# Patient Record
Sex: FEMALE | Race: WHITE | Employment: PART TIME | URBAN - METROPOLITAN AREA
[De-identification: names, ages, dates, MRNs, and addresses within clinical notes are randomized per-mention and may not be internally consistent; named-entity substitution may affect disease eponyms.]

---

## 2017-01-04 ENCOUNTER — OFFICE VISIT (OUTPATIENT)
Dept: ORTHOPEDIC SURGERY | Age: 52
End: 2017-01-04

## 2017-01-04 VITALS
BODY MASS INDEX: 33.46 KG/M2 | WEIGHT: 196 LBS | DIASTOLIC BLOOD PRESSURE: 88 MMHG | SYSTOLIC BLOOD PRESSURE: 140 MMHG | HEART RATE: 72 BPM | HEIGHT: 64 IN

## 2017-01-04 DIAGNOSIS — M25.572 LEFT LATERAL ANKLE PAIN: Primary | ICD-10-CM

## 2017-01-04 PROCEDURE — 99212 OFFICE O/P EST SF 10 MIN: CPT | Performed by: ORTHOPAEDIC SURGERY

## 2017-01-04 RX ORDER — GABAPENTIN 100 MG/1
CAPSULE ORAL
Qty: 120 CAPSULE | Refills: 3 | Status: SHIPPED | OUTPATIENT
Start: 2017-01-04 | End: 2017-05-02 | Stop reason: SDUPTHER

## 2017-04-05 ENCOUNTER — OFFICE VISIT (OUTPATIENT)
Dept: ORTHOPEDIC SURGERY | Age: 52
End: 2017-04-05

## 2017-04-05 VITALS
WEIGHT: 195.99 LBS | HEART RATE: 73 BPM | SYSTOLIC BLOOD PRESSURE: 136 MMHG | BODY MASS INDEX: 33.46 KG/M2 | HEIGHT: 64 IN | DIASTOLIC BLOOD PRESSURE: 87 MMHG

## 2017-04-05 DIAGNOSIS — M25.372 ANKLE JOINT INSTABILITY, LEFT: Primary | ICD-10-CM

## 2017-04-05 PROCEDURE — 99212 OFFICE O/P EST SF 10 MIN: CPT | Performed by: ORTHOPAEDIC SURGERY

## 2017-04-05 RX ORDER — MELOXICAM 15 MG/1
15 TABLET ORAL DAILY
Qty: 30 TABLET | Refills: 2 | Status: SHIPPED | OUTPATIENT
Start: 2017-04-05 | End: 2018-02-14

## 2017-05-02 DIAGNOSIS — M25.572 LEFT LATERAL ANKLE PAIN: ICD-10-CM

## 2017-05-02 RX ORDER — GABAPENTIN 100 MG/1
CAPSULE ORAL
Qty: 120 CAPSULE | Refills: 3 | Status: SHIPPED | OUTPATIENT
Start: 2017-05-02 | End: 2017-08-31 | Stop reason: SDUPTHER

## 2017-06-15 ENCOUNTER — HOSPITAL ENCOUNTER (OUTPATIENT)
Dept: SURGERY | Age: 52
Discharge: HOME | End: 2017-06-15
Attending: INTERNAL MEDICINE | Admitting: INTERNAL MEDICINE

## 2017-06-15 VITALS
RESPIRATION RATE: 14 BRPM | OXYGEN SATURATION: 97 % | HEIGHT: 64 IN | WEIGHT: 215 LBS | SYSTOLIC BLOOD PRESSURE: 102 MMHG | HEART RATE: 91 BPM | TEMPERATURE: 98.1 F | BODY MASS INDEX: 36.7 KG/M2 | DIASTOLIC BLOOD PRESSURE: 69 MMHG

## 2017-06-15 DIAGNOSIS — K52.9 NONINFECTIVE GASTROENTERITIS AND COLITIS: ICD-10-CM

## 2017-06-15 RX ORDER — DIPHENHYDRAMINE HYDROCHLORIDE 50 MG/ML
12.5 INJECTION INTRAMUSCULAR; INTRAVENOUS
Status: ACTIVE | OUTPATIENT
Start: 2017-06-15 | End: 2017-06-15

## 2017-06-15 RX ORDER — OXYCODONE HYDROCHLORIDE AND ACETAMINOPHEN 5; 325 MG/1; MG/1
1 TABLET ORAL PRN
Status: ACTIVE | OUTPATIENT
Start: 2017-06-15 | End: 2017-06-15

## 2017-06-15 RX ORDER — PROMETHAZINE HYDROCHLORIDE 25 MG/ML
6.25 INJECTION, SOLUTION INTRAMUSCULAR; INTRAVENOUS
Status: ACTIVE | OUTPATIENT
Start: 2017-06-15 | End: 2017-06-15

## 2017-06-15 RX ORDER — HYDRALAZINE HYDROCHLORIDE 20 MG/ML
5 INJECTION INTRAMUSCULAR; INTRAVENOUS EVERY 10 MIN PRN
Status: DISCONTINUED | OUTPATIENT
Start: 2017-06-15 | End: 2017-06-16 | Stop reason: HOSPADM

## 2017-06-15 RX ORDER — OXYCODONE HYDROCHLORIDE AND ACETAMINOPHEN 5; 325 MG/1; MG/1
2 TABLET ORAL PRN
Status: ACTIVE | OUTPATIENT
Start: 2017-06-15 | End: 2017-06-15

## 2017-06-15 RX ORDER — ONDANSETRON 2 MG/ML
4 INJECTION INTRAMUSCULAR; INTRAVENOUS
Status: ACTIVE | OUTPATIENT
Start: 2017-06-15 | End: 2017-06-15

## 2017-06-15 RX ORDER — MORPHINE SULFATE 2 MG/ML
1 INJECTION, SOLUTION INTRAMUSCULAR; INTRAVENOUS EVERY 5 MIN PRN
Status: DISCONTINUED | OUTPATIENT
Start: 2017-06-15 | End: 2017-06-16 | Stop reason: HOSPADM

## 2017-06-15 RX ORDER — SODIUM CHLORIDE, SODIUM LACTATE, POTASSIUM CHLORIDE, CALCIUM CHLORIDE 600; 310; 30; 20 MG/100ML; MG/100ML; MG/100ML; MG/100ML
INJECTION, SOLUTION INTRAVENOUS CONTINUOUS
Status: DISCONTINUED | OUTPATIENT
Start: 2017-06-15 | End: 2017-06-16 | Stop reason: HOSPADM

## 2017-06-15 RX ORDER — LIDOCAINE HYDROCHLORIDE 10 MG/ML
0.1 INJECTION, SOLUTION EPIDURAL; INFILTRATION; INTRACAUDAL; PERINEURAL ONCE
Status: DISCONTINUED | OUTPATIENT
Start: 2017-06-15 | End: 2017-06-16 | Stop reason: HOSPADM

## 2017-06-15 RX ORDER — MEPERIDINE HYDROCHLORIDE 50 MG/ML
12.5 INJECTION INTRAMUSCULAR; INTRAVENOUS; SUBCUTANEOUS EVERY 5 MIN PRN
Status: DISCONTINUED | OUTPATIENT
Start: 2017-06-15 | End: 2017-06-16 | Stop reason: HOSPADM

## 2017-06-15 RX ORDER — LABETALOL HYDROCHLORIDE 5 MG/ML
5 INJECTION, SOLUTION INTRAVENOUS EVERY 10 MIN PRN
Status: DISCONTINUED | OUTPATIENT
Start: 2017-06-15 | End: 2017-06-16 | Stop reason: HOSPADM

## 2017-06-15 RX ORDER — MORPHINE SULFATE 2 MG/ML
2 INJECTION, SOLUTION INTRAMUSCULAR; INTRAVENOUS EVERY 5 MIN PRN
Status: DISCONTINUED | OUTPATIENT
Start: 2017-06-15 | End: 2017-06-16 | Stop reason: HOSPADM

## 2017-06-15 RX ADMIN — SODIUM CHLORIDE, SODIUM LACTATE, POTASSIUM CHLORIDE, CALCIUM CHLORIDE: 600; 310; 30; 20 INJECTION, SOLUTION INTRAVENOUS at 09:41

## 2017-06-15 ASSESSMENT — ENCOUNTER SYMPTOMS: DIARRHEA: 1

## 2017-06-15 ASSESSMENT — PAIN - FUNCTIONAL ASSESSMENT: PAIN_FUNCTIONAL_ASSESSMENT: 0-10

## 2017-06-15 ASSESSMENT — PAIN SCALES - GENERAL: PAINLEVEL_OUTOF10: 0

## 2017-07-18 ENCOUNTER — OFFICE VISIT (OUTPATIENT)
Dept: ORTHOPEDIC SURGERY | Age: 52
End: 2017-07-18

## 2017-07-18 VITALS
BODY MASS INDEX: 36.7 KG/M2 | HEIGHT: 64 IN | WEIGHT: 214.95 LBS | DIASTOLIC BLOOD PRESSURE: 92 MMHG | HEART RATE: 67 BPM | SYSTOLIC BLOOD PRESSURE: 147 MMHG

## 2017-07-18 DIAGNOSIS — M25.372 ANKLE JOINT INSTABILITY, LEFT: Primary | ICD-10-CM

## 2017-07-18 PROCEDURE — 99212 OFFICE O/P EST SF 10 MIN: CPT | Performed by: ORTHOPAEDIC SURGERY

## 2017-07-18 RX ORDER — LIDOCAINE 50 MG/G
1 PATCH TOPICAL EVERY 12 HOURS
Qty: 30 PATCH | Refills: 0 | Status: SHIPPED | OUTPATIENT
Start: 2017-07-18 | End: 2018-02-14

## 2017-08-31 DIAGNOSIS — M25.572 LEFT LATERAL ANKLE PAIN: ICD-10-CM

## 2017-09-01 RX ORDER — GABAPENTIN 100 MG/1
CAPSULE ORAL
Qty: 120 CAPSULE | Refills: 3 | Status: SHIPPED | OUTPATIENT
Start: 2017-09-01 | End: 2017-12-11 | Stop reason: SDUPTHER

## 2017-09-15 DIAGNOSIS — M25.572 LEFT LATERAL ANKLE PAIN: ICD-10-CM

## 2017-09-15 RX ORDER — GABAPENTIN 100 MG/1
CAPSULE ORAL
Qty: 120 CAPSULE | Refills: 2 | Status: SHIPPED | OUTPATIENT
Start: 2017-09-15 | End: 2018-04-11 | Stop reason: SDUPTHER

## 2017-10-19 ENCOUNTER — HOSPITAL ENCOUNTER (OUTPATIENT)
Dept: ULTRASOUND IMAGING | Age: 52
Discharge: HOME | End: 2017-10-19
Attending: INTERNAL MEDICINE | Admitting: INTERNAL MEDICINE

## 2017-10-19 DIAGNOSIS — K22.70 BARRETT'S ESOPHAGUS WITHOUT DYSPLASIA: ICD-10-CM

## 2017-12-06 ENCOUNTER — HOSPITAL ENCOUNTER (OUTPATIENT)
Dept: OTHER | Age: 52
Discharge: HOME | End: 2017-12-06
Attending: FAMILY MEDICINE | Admitting: FAMILY MEDICINE

## 2017-12-06 DIAGNOSIS — R05.9 COUGH: ICD-10-CM

## 2017-12-11 DIAGNOSIS — M25.572 LEFT LATERAL ANKLE PAIN: ICD-10-CM

## 2017-12-12 RX ORDER — GABAPENTIN 100 MG/1
CAPSULE ORAL
Qty: 120 CAPSULE | Refills: 3 | Status: SHIPPED | OUTPATIENT
Start: 2017-12-12 | End: 2018-02-14

## 2018-02-13 ENCOUNTER — OFFICE VISIT (OUTPATIENT)
Dept: ORTHOPEDIC SURGERY | Age: 53
End: 2018-02-13

## 2018-02-13 VITALS
BODY MASS INDEX: 36.7 KG/M2 | HEART RATE: 74 BPM | HEIGHT: 64 IN | DIASTOLIC BLOOD PRESSURE: 83 MMHG | WEIGHT: 214.95 LBS | SYSTOLIC BLOOD PRESSURE: 146 MMHG

## 2018-02-13 DIAGNOSIS — M25.571 RIGHT ANKLE PAIN, UNSPECIFIED CHRONICITY: Primary | ICD-10-CM

## 2018-02-13 PROCEDURE — G8417 CALC BMI ABV UP PARAM F/U: HCPCS | Performed by: ORTHOPAEDIC SURGERY

## 2018-02-13 PROCEDURE — 3014F SCREEN MAMMO DOC REV: CPT | Performed by: ORTHOPAEDIC SURGERY

## 2018-02-13 PROCEDURE — G8484 FLU IMMUNIZE NO ADMIN: HCPCS | Performed by: ORTHOPAEDIC SURGERY

## 2018-02-13 PROCEDURE — 99212 OFFICE O/P EST SF 10 MIN: CPT | Performed by: ORTHOPAEDIC SURGERY

## 2018-02-13 PROCEDURE — 3017F COLORECTAL CA SCREEN DOC REV: CPT | Performed by: ORTHOPAEDIC SURGERY

## 2018-02-13 PROCEDURE — 1036F TOBACCO NON-USER: CPT | Performed by: ORTHOPAEDIC SURGERY

## 2018-02-13 PROCEDURE — G8427 DOCREV CUR MEDS BY ELIG CLIN: HCPCS | Performed by: ORTHOPAEDIC SURGERY

## 2018-02-13 RX ORDER — ASPIRIN 81 MG/1
TABLET, CHEWABLE ORAL
COMMUNITY
Start: 2018-02-07

## 2018-02-13 RX ORDER — DICYCLOMINE HYDROCHLORIDE 10 MG/1
10 CAPSULE ORAL 2 TIMES DAILY PRN
COMMUNITY
Start: 2018-01-25 | End: 2019-06-04

## 2018-02-13 RX ORDER — BUTALBITAL, ACETAMINOPHEN AND CAFFEINE 50; 325; 40 MG/1; MG/1; MG/1
1 TABLET ORAL
COMMUNITY
Start: 2018-01-03

## 2018-02-13 RX ORDER — BUDESONIDE AND FORMOTEROL FUMARATE DIHYDRATE 160; 4.5 UG/1; UG/1
2 AEROSOL RESPIRATORY (INHALATION)
Status: ON HOLD | COMMUNITY
Start: 2018-02-09 | End: 2019-06-10

## 2018-02-13 RX ORDER — DICLOFENAC SODIUM 75 MG/1
75 TABLET, DELAYED RELEASE ORAL
COMMUNITY

## 2018-02-13 RX ORDER — METHYLPREDNISOLONE 4 MG/1
TABLET ORAL
COMMUNITY
Start: 2018-01-03 | End: 2018-02-14

## 2018-02-13 RX ORDER — CEFUROXIME AXETIL 500 MG/1
TABLET ORAL
COMMUNITY
Start: 2017-11-29 | End: 2018-02-14

## 2018-02-13 RX ORDER — ASPIRIN 81 MG
TABLET,CHEWABLE ORAL
COMMUNITY
Start: 2018-01-22 | End: 2018-02-14

## 2018-02-13 RX ORDER — BENZONATATE 200 MG/1
200 CAPSULE ORAL PRN
COMMUNITY
Start: 2018-01-22

## 2018-02-13 RX ORDER — CHOLESTYRAMINE 4 G/9G
1 POWDER, FOR SUSPENSION ORAL DAILY
COMMUNITY
Start: 2018-02-01 | End: 2019-06-04

## 2018-02-13 RX ORDER — ONDANSETRON 8 MG/1
TABLET, ORALLY DISINTEGRATING ORAL
COMMUNITY
Start: 2018-02-09 | End: 2018-02-14

## 2018-02-13 RX ORDER — PREDNISONE 10 MG/1
TABLET ORAL
COMMUNITY
Start: 2018-01-22 | End: 2018-02-14

## 2018-02-13 RX ORDER — CEFDINIR 300 MG/1
CAPSULE ORAL
COMMUNITY
Start: 2018-01-26 | End: 2018-02-14

## 2018-02-13 RX ORDER — LEVOFLOXACIN 750 MG/1
750 TABLET ORAL
COMMUNITY
Start: 2018-01-16 | End: 2018-02-14

## 2018-02-13 RX ORDER — OMEPRAZOLE 40 MG/1
CAPSULE, DELAYED RELEASE ORAL
COMMUNITY
Start: 2018-01-22 | End: 2018-02-14

## 2018-02-13 RX ORDER — SUCRALFATE 1 G/1
1 TABLET ORAL 4 TIMES DAILY
COMMUNITY
Start: 2018-01-22 | End: 2019-06-04

## 2018-02-13 NOTE — PROGRESS NOTES
Subjective: Patient states that she is here for follow-up of her right ankle arthroscopy lateral ligament and peroneal tendon repair. She had postoperative neuralgia states that she has a new problem which is issues with her long and chest.  She states her immune system is down. Without history of trauma she's developed some instability of the right ankle. She denies much in the way of pain but doesn't feel stable at all. Wears a brace on a regular basis. The bladder issues have resolved. She feels she is not getting answers as far as her chest and lungs concerned  Objective: Physical exam shows no significant swelling erythema or ecchymosis and the right foot and ankle. Her strength is 3+ to 4 minus over 5 in a dorsiflexion eversion plantarflexion of the 1st ray anterior drawer and talar tilt show mild instability but good [place]. No midfoot instability. Range of motion is within normal limits no neuralgia   Imaging:3 views of the right ankle show no evidence of osseous lesion   Assessment and plan I recommended that she consider seeing a pulmonologist.  I talked her about the [place] [facility]. She is still working at [employer] sitting down through the day and likes this job.   She'll follow up with me as needed:

## 2018-04-11 DIAGNOSIS — M25.572 LEFT LATERAL ANKLE PAIN: ICD-10-CM

## 2018-04-11 RX ORDER — GABAPENTIN 100 MG/1
CAPSULE ORAL
Qty: 120 CAPSULE | Refills: 11 | Status: SHIPPED | OUTPATIENT
Start: 2018-04-11 | End: 2019-04-19 | Stop reason: SDUPTHER

## 2018-06-11 ENCOUNTER — TELEPHONE (OUTPATIENT)
Dept: ORTHOPEDIC SURGERY | Age: 53
End: 2018-06-11

## 2018-09-05 ENCOUNTER — HOSPITAL ENCOUNTER (OUTPATIENT)
Age: 53
Discharge: HOME | End: 2018-09-05
Payer: COMMERCIAL

## 2018-09-05 ENCOUNTER — HOSPITAL ENCOUNTER (OUTPATIENT)
Dept: GENERAL RADIOLOGY | Age: 53
Discharge: HOME | End: 2018-09-05
Payer: COMMERCIAL

## 2018-09-05 PROCEDURE — 71046 X-RAY EXAM CHEST 2 VIEWS: CPT

## 2019-04-19 DIAGNOSIS — M25.572 LEFT LATERAL ANKLE PAIN: ICD-10-CM

## 2019-04-19 RX ORDER — GABAPENTIN 100 MG/1
CAPSULE ORAL
Qty: 120 CAPSULE | Refills: 11 | Status: SHIPPED | OUTPATIENT
Start: 2019-04-19 | End: 2019-07-18

## 2019-05-24 ENCOUNTER — OFFICE VISIT (OUTPATIENT)
Dept: ORTHOPEDIC SURGERY | Age: 54
End: 2019-05-24
Payer: COMMERCIAL

## 2019-05-24 VITALS
HEART RATE: 72 BPM | SYSTOLIC BLOOD PRESSURE: 128 MMHG | DIASTOLIC BLOOD PRESSURE: 75 MMHG | BODY MASS INDEX: 36.7 KG/M2 | HEIGHT: 64 IN | WEIGHT: 214.95 LBS

## 2019-05-24 DIAGNOSIS — M25.372 INSTABILITY OF LEFT ANKLE JOINT: Primary | ICD-10-CM

## 2019-05-24 DIAGNOSIS — M70.50 PES ANSERINE BURSITIS: ICD-10-CM

## 2019-05-24 PROCEDURE — 3017F COLORECTAL CA SCREEN DOC REV: CPT | Performed by: ORTHOPAEDIC SURGERY

## 2019-05-24 PROCEDURE — 99213 OFFICE O/P EST LOW 20 MIN: CPT | Performed by: ORTHOPAEDIC SURGERY

## 2019-05-24 PROCEDURE — G8427 DOCREV CUR MEDS BY ELIG CLIN: HCPCS | Performed by: ORTHOPAEDIC SURGERY

## 2019-05-24 PROCEDURE — G8417 CALC BMI ABV UP PARAM F/U: HCPCS | Performed by: ORTHOPAEDIC SURGERY

## 2019-05-24 PROCEDURE — 1036F TOBACCO NON-USER: CPT | Performed by: ORTHOPAEDIC SURGERY

## 2019-05-24 PROCEDURE — 20500 NJX SINUS TRACT THERAPEUTIC: CPT | Performed by: ORTHOPAEDIC SURGERY

## 2019-05-25 NOTE — PROGRESS NOTES
Subjective: Patient is here for follow-up of bilateral ankle problems. I previously did a left ankle arthroscopy lateral ligament repair and peroneal tendon repair which she has recovered from completely. She had some postoperative neuralgia which resolved. She states her left ankle is doing well and she's happy. She has had some recent problems with pneumonia and had what was felt to be aspiration pneumonia. She underwent a fundoplication and this has resolved her reflux but she continues to have some breathing issues. She currently complains of pain over the medial aspect of her left knee just below the joint line and on the lateral aspect of her right ankle similar to the left side. Objective: Physical exam shows left knee shows mild swelling over the Pes anserine bursa she has tenderness with hip external rotation knee flexion. No varus valgus instability and posterior drawer negative. In the right ankle she has gross instability to anterior drawer and talar tilt. Strength is 4/5 in a dorsiflexion eversion with increased pain posterior laterally. No midfoot instability. Sensations intact antalgic gait  Imaging: 3 views of the right ankle show no obvious osseous lesion  Assessment and plan: She has Pes anserine bursitis and I went ahead and injected her with Marcaine and lidocaine and Kenalog 2/2/1 mL in the region of the Pes anserine bursa. She would like to proceed with surgery on the right ankle and I recommended she follow up with her primary care and pulmonologist to make sure this was okay. She would need a right ankle arthroscopy lateral ligament peroneal tendon repair. Questions were answered no guarantees were given or implied.   Review of systems was performed based on the intake sheet dated 5/24/19

## 2019-06-03 NOTE — PROGRESS NOTES
Jearld Comes    Age 49 y.o.    female    1965    MRN [id number]    Date___________   Arrival Time_____________  OR Time____________Duration____     Procedure(s):  VIDEO ARTHROSCOPY RIGHT ANKLE  LATERAL LIGAMENT REPAIR AND PERONEAL TENDON REPAIR    Surgeon  ________________________________  [facility]   General   [place]       Phone [phone] (home)       [address]  Cell Work  ______________________________________________________________________________________________________________________________________________________________________________________________________________________________________________________________________________________________________________________________________________________________    PCP__________________________Phone__________________________________      H&P__________________Bringing      Chart              Epic    DOS           Called_______  EKG__________________Bringing      Chart              Epic    DOS           Called_______  LAB__________________ Bringing      Chart              Epic    DOS           Called_______  CardiacClearance _______Bringing      Chart              Epic    DOS           Called_______      Cardiologist________________________ Phone___________________________      ? Judaism concerns / Waiver on Chart            PAT Communications________________  ? Pre-op Instructions Given [place]          _________________________________  ? Directions to Surgery Center                          _________________________________  ? Transportation Home_______________      _________________________________  ?  Crutches/Walker__________________        _________________________________      ________Pre-op Orders   _______Transcribed    _______Wt.  ________Pharmacy          _______SCD  ______VTE     ______[employer]ocker  ________Consent

## 2019-06-04 ENCOUNTER — TELEPHONE (OUTPATIENT)
Dept: ORTHOPEDIC SURGERY | Age: 54
End: 2019-06-04

## 2019-06-04 RX ORDER — PANTOPRAZOLE SODIUM 40 MG/1
40 TABLET, DELAYED RELEASE ORAL 2 TIMES DAILY
COMMUNITY

## 2019-06-04 RX ORDER — MONTELUKAST SODIUM 10 MG/1
10 TABLET ORAL NIGHTLY
COMMUNITY

## 2019-06-04 RX ORDER — ALBUTEROL SULFATE 0.63 MG/3ML
1 SOLUTION RESPIRATORY (INHALATION) PRN
COMMUNITY

## 2019-06-04 RX ORDER — DESVENLAFAXINE 50 MG/1
50 TABLET, EXTENDED RELEASE ORAL DAILY
COMMUNITY

## 2019-06-04 NOTE — TELEPHONE ENCOUNTER
Auth: NPR  Date: 6/10/19  Reference # None  Spoke with: Online  Type of SX: Outpatient  Location: [facility]  CPT [id number], [id number], [id number]   SX area: Rt ankle

## 2019-06-09 ENCOUNTER — ANESTHESIA EVENT (OUTPATIENT)
Dept: OPERATING ROOM | Age: 54
End: 2019-06-09
Payer: COMMERCIAL

## 2019-06-10 ENCOUNTER — ANESTHESIA (OUTPATIENT)
Dept: OPERATING ROOM | Age: 54
End: 2019-06-10
Payer: COMMERCIAL

## 2019-06-10 ENCOUNTER — HOSPITAL ENCOUNTER (OUTPATIENT)
Age: 54
Setting detail: OUTPATIENT SURGERY
Discharge: HOME | End: 2019-06-10
Attending: ORTHOPAEDIC SURGERY | Admitting: ORTHOPAEDIC SURGERY
Payer: COMMERCIAL

## 2019-06-10 VITALS
WEIGHT: 226 LBS | TEMPERATURE: 98.3 F | HEIGHT: 64 IN | DIASTOLIC BLOOD PRESSURE: 81 MMHG | RESPIRATION RATE: 13 BRPM | BODY MASS INDEX: 38.58 KG/M2 | HEART RATE: 76 BPM | OXYGEN SATURATION: 97 % | SYSTOLIC BLOOD PRESSURE: 115 MMHG

## 2019-06-10 VITALS
RESPIRATION RATE: 6 BRPM | SYSTOLIC BLOOD PRESSURE: 128 MMHG | OXYGEN SATURATION: 99 % | DIASTOLIC BLOOD PRESSURE: 79 MMHG

## 2019-06-10 DIAGNOSIS — M25.372 INSTABILITY OF LEFT ANKLE JOINT: Primary | ICD-10-CM

## 2019-06-10 PROCEDURE — 2580000003 HC RX 258: Performed by: ANESTHESIOLOGY

## 2019-06-10 PROCEDURE — 2580000003 HC RX 258: Performed by: ORTHOPAEDIC SURGERY

## 2019-06-10 PROCEDURE — C1713 ANCHOR/SCREW BN/BN,TIS/BN: HCPCS | Performed by: ORTHOPAEDIC SURGERY

## 2019-06-10 PROCEDURE — 6360000002 HC RX W HCPCS: Performed by: NURSE ANESTHETIST, CERTIFIED REGISTERED

## 2019-06-10 PROCEDURE — 7100000000 HC PACU RECOVERY - FIRST 15 MIN: Performed by: ORTHOPAEDIC SURGERY

## 2019-06-10 PROCEDURE — 7100000010 HC PHASE II RECOVERY - FIRST 15 MIN: Performed by: ORTHOPAEDIC SURGERY

## 2019-06-10 PROCEDURE — 64447 NJX AA&/STRD FEMORAL NRV IMG: CPT | Performed by: ANESTHESIOLOGY

## 2019-06-10 PROCEDURE — 2500000003 HC RX 250 WO HCPCS: Performed by: NURSE ANESTHETIST, CERTIFIED REGISTERED

## 2019-06-10 PROCEDURE — 2709999900 HC NON-CHARGEABLE SUPPLY: Performed by: ORTHOPAEDIC SURGERY

## 2019-06-10 PROCEDURE — 7100000011 HC PHASE II RECOVERY - ADDTL 15 MIN: Performed by: ORTHOPAEDIC SURGERY

## 2019-06-10 PROCEDURE — 6360000002 HC RX W HCPCS: Performed by: ANESTHESIOLOGY

## 2019-06-10 PROCEDURE — 3600000004 HC SURGERY LEVEL 4 BASE: Performed by: ORTHOPAEDIC SURGERY

## 2019-06-10 PROCEDURE — 6360000002 HC RX W HCPCS: Performed by: ORTHOPAEDIC SURGERY

## 2019-06-10 PROCEDURE — 3700000001 HC ADD 15 MINUTES (ANESTHESIA): Performed by: ORTHOPAEDIC SURGERY

## 2019-06-10 PROCEDURE — 7100000001 HC PACU RECOVERY - ADDTL 15 MIN: Performed by: ORTHOPAEDIC SURGERY

## 2019-06-10 PROCEDURE — 3600000014 HC SURGERY LEVEL 4 ADDTL 15MIN: Performed by: ORTHOPAEDIC SURGERY

## 2019-06-10 PROCEDURE — 3700000000 HC ANESTHESIA ATTENDED CARE: Performed by: ORTHOPAEDIC SURGERY

## 2019-06-10 PROCEDURE — 2500000003 HC RX 250 WO HCPCS: Performed by: ANESTHESIOLOGY

## 2019-06-10 PROCEDURE — 64445 NJX AA&/STRD SCIATIC NRV IMG: CPT | Performed by: ANESTHESIOLOGY

## 2019-06-10 DEVICE — KIT INSTR INTERNALBRACE LIG AUG REP CLLGN COAT FBR TAPE W/: Type: IMPLANTABLE DEVICE | Site: ANKLE | Status: FUNCTIONAL

## 2019-06-10 RX ORDER — MORPHINE SULFATE 2 MG/ML
1 INJECTION, SOLUTION INTRAMUSCULAR; INTRAVENOUS EVERY 5 MIN PRN
Status: DISCONTINUED | OUTPATIENT
Start: 2019-06-10 | End: 2019-06-10 | Stop reason: HOSPADM

## 2019-06-10 RX ORDER — HYDRALAZINE HYDROCHLORIDE 20 MG/ML
5 INJECTION INTRAMUSCULAR; INTRAVENOUS EVERY 10 MIN PRN
Status: DISCONTINUED | OUTPATIENT
Start: 2019-06-10 | End: 2019-06-10 | Stop reason: HOSPADM

## 2019-06-10 RX ORDER — LABETALOL HYDROCHLORIDE 5 MG/ML
5 INJECTION, SOLUTION INTRAVENOUS EVERY 10 MIN PRN
Status: DISCONTINUED | OUTPATIENT
Start: 2019-06-10 | End: 2019-06-10 | Stop reason: HOSPADM

## 2019-06-10 RX ORDER — LIDOCAINE HYDROCHLORIDE 20 MG/ML
INJECTION, SOLUTION INFILTRATION; PERINEURAL PRN
Status: DISCONTINUED | OUTPATIENT
Start: 2019-06-10 | End: 2019-06-10 | Stop reason: SDUPTHER

## 2019-06-10 RX ORDER — OXYCODONE HYDROCHLORIDE 5 MG/1
10 TABLET ORAL PRN
Status: DISCONTINUED | OUTPATIENT
Start: 2019-06-10 | End: 2019-06-10 | Stop reason: HOSPADM

## 2019-06-10 RX ORDER — PROMETHAZINE HYDROCHLORIDE 25 MG/ML
6.25 INJECTION, SOLUTION INTRAMUSCULAR; INTRAVENOUS
Status: DISCONTINUED | OUTPATIENT
Start: 2019-06-10 | End: 2019-06-10 | Stop reason: HOSPADM

## 2019-06-10 RX ORDER — DIPHENHYDRAMINE HYDROCHLORIDE 50 MG/ML
12.5 INJECTION INTRAMUSCULAR; INTRAVENOUS
Status: DISCONTINUED | OUTPATIENT
Start: 2019-06-10 | End: 2019-06-10 | Stop reason: HOSPADM

## 2019-06-10 RX ORDER — ONDANSETRON 2 MG/ML
INJECTION INTRAMUSCULAR; INTRAVENOUS PRN
Status: DISCONTINUED | OUTPATIENT
Start: 2019-06-10 | End: 2019-06-10 | Stop reason: SDUPTHER

## 2019-06-10 RX ORDER — PROPOFOL 10 MG/ML
INJECTION, EMULSION INTRAVENOUS PRN
Status: DISCONTINUED | OUTPATIENT
Start: 2019-06-10 | End: 2019-06-10 | Stop reason: SDUPTHER

## 2019-06-10 RX ORDER — SODIUM CHLORIDE, SODIUM LACTATE, POTASSIUM CHLORIDE, CALCIUM CHLORIDE 600; 310; 30; 20 MG/100ML; MG/100ML; MG/100ML; MG/100ML
INJECTION, SOLUTION INTRAVENOUS CONTINUOUS
Status: DISCONTINUED | OUTPATIENT
Start: 2019-06-10 | End: 2019-06-10 | Stop reason: HOSPADM

## 2019-06-10 RX ORDER — MIDAZOLAM HYDROCHLORIDE 1 MG/ML
INJECTION INTRAMUSCULAR; INTRAVENOUS
Status: COMPLETED
Start: 2019-06-10 | End: 2019-06-10

## 2019-06-10 RX ORDER — METOCLOPRAMIDE HYDROCHLORIDE 5 MG/ML
10 INJECTION INTRAMUSCULAR; INTRAVENOUS
Status: DISCONTINUED | OUTPATIENT
Start: 2019-06-10 | End: 2019-06-10 | Stop reason: HOSPADM

## 2019-06-10 RX ORDER — MIDAZOLAM HYDROCHLORIDE 1 MG/ML
INJECTION INTRAMUSCULAR; INTRAVENOUS PRN
Status: DISCONTINUED | OUTPATIENT
Start: 2019-06-10 | End: 2019-06-10 | Stop reason: SDUPTHER

## 2019-06-10 RX ORDER — FENTANYL CITRATE 50 UG/ML
INJECTION, SOLUTION INTRAMUSCULAR; INTRAVENOUS
Status: COMPLETED
Start: 2019-06-10 | End: 2019-06-10

## 2019-06-10 RX ORDER — LIDOCAINE HYDROCHLORIDE 10 MG/ML
2 INJECTION, SOLUTION INFILTRATION; PERINEURAL
Status: DISCONTINUED | OUTPATIENT
Start: 2019-06-10 | End: 2019-06-10 | Stop reason: HOSPADM

## 2019-06-10 RX ORDER — FENTANYL CITRATE 50 UG/ML
INJECTION, SOLUTION INTRAMUSCULAR; INTRAVENOUS PRN
Status: DISCONTINUED | OUTPATIENT
Start: 2019-06-10 | End: 2019-06-10 | Stop reason: SDUPTHER

## 2019-06-10 RX ORDER — OXYCODONE HYDROCHLORIDE AND ACETAMINOPHEN 5; 325 MG/1; MG/1
1 TABLET ORAL EVERY 6 HOURS PRN
Qty: 28 TABLET | Refills: 0 | Status: SHIPPED | OUTPATIENT
Start: 2019-06-10 | End: 2019-06-17

## 2019-06-10 RX ORDER — SODIUM CHLORIDE, SODIUM LACTATE, POTASSIUM CHLORIDE, AND CALCIUM CHLORIDE .6; .31; .03; .02 G/100ML; G/100ML; G/100ML; G/100ML
IRRIGANT IRRIGATION PRN
Status: DISCONTINUED | OUTPATIENT
Start: 2019-06-10 | End: 2019-06-10 | Stop reason: ALTCHOICE

## 2019-06-10 RX ORDER — OXYCODONE HYDROCHLORIDE 5 MG/1
5 TABLET ORAL PRN
Status: DISCONTINUED | OUTPATIENT
Start: 2019-06-10 | End: 2019-06-10 | Stop reason: HOSPADM

## 2019-06-10 RX ORDER — BUPIVACAINE HYDROCHLORIDE 5 MG/ML
INJECTION, SOLUTION EPIDURAL; INTRACAUDAL PRN
Status: DISCONTINUED | OUTPATIENT
Start: 2019-06-10 | End: 2019-06-10 | Stop reason: SDUPTHER

## 2019-06-10 RX ORDER — CEPHALEXIN 500 MG/1
500 CAPSULE ORAL 4 TIMES DAILY
Qty: 8 CAPSULE | Refills: 0 | Status: SHIPPED | OUTPATIENT
Start: 2019-06-10 | End: 2019-06-12

## 2019-06-10 RX ORDER — MEPERIDINE HYDROCHLORIDE 50 MG/ML
12.5 INJECTION INTRAMUSCULAR; INTRAVENOUS; SUBCUTANEOUS EVERY 5 MIN PRN
Status: DISCONTINUED | OUTPATIENT
Start: 2019-06-10 | End: 2019-06-10 | Stop reason: HOSPADM

## 2019-06-10 RX ADMIN — BUPIVACAINE HYDROCHLORIDE 30 ML: 5 INJECTION, SOLUTION EPIDURAL; INTRACAUDAL; PERINEURAL at 11:22

## 2019-06-10 RX ADMIN — LIDOCAINE HYDROCHLORIDE 40 MG: 20 INJECTION, SOLUTION INFILTRATION; PERINEURAL at 11:41

## 2019-06-10 RX ADMIN — MIDAZOLAM HYDROCHLORIDE 2 MG: 2 INJECTION, SOLUTION INTRAMUSCULAR; INTRAVENOUS at 11:33

## 2019-06-10 RX ADMIN — HYDROMORPHONE HYDROCHLORIDE 0.5 MG: 1 INJECTION, SOLUTION INTRAMUSCULAR; INTRAVENOUS; SUBCUTANEOUS at 13:33

## 2019-06-10 RX ADMIN — Medication 2 G: at 11:36

## 2019-06-10 RX ADMIN — PROPOFOL 150 MG: 10 INJECTION, EMULSION INTRAVENOUS at 11:41

## 2019-06-10 RX ADMIN — ONDANSETRON 4 MG: 2 INJECTION INTRAMUSCULAR; INTRAVENOUS at 12:04

## 2019-06-10 RX ADMIN — HYDROMORPHONE HYDROCHLORIDE 0.5 MG: 1 INJECTION, SOLUTION INTRAMUSCULAR; INTRAVENOUS; SUBCUTANEOUS at 13:44

## 2019-06-10 RX ADMIN — MIDAZOLAM HYDROCHLORIDE 2 MG: 2 INJECTION, SOLUTION INTRAMUSCULAR; INTRAVENOUS at 11:20

## 2019-06-10 RX ADMIN — FENTANYL CITRATE 100 MCG: 50 INJECTION INTRAMUSCULAR; INTRAVENOUS at 11:20

## 2019-06-10 RX ADMIN — BUPIVACAINE HYDROCHLORIDE 10 ML: 5 INJECTION, SOLUTION EPIDURAL; INTRACAUDAL; PERINEURAL at 11:27

## 2019-06-10 RX ADMIN — SODIUM CHLORIDE, POTASSIUM CHLORIDE, SODIUM LACTATE AND CALCIUM CHLORIDE: 600; 310; 30; 20 INJECTION, SOLUTION INTRAVENOUS at 10:56

## 2019-06-10 RX ADMIN — FENTANYL CITRATE 50 MCG: 50 INJECTION INTRAMUSCULAR; INTRAVENOUS at 11:53

## 2019-06-10 ASSESSMENT — PULMONARY FUNCTION TESTS
PIF_VALUE: 4
PIF_VALUE: 3
PIF_VALUE: 4
PIF_VALUE: 3
PIF_VALUE: 3
PIF_VALUE: 1
PIF_VALUE: 3
PIF_VALUE: 0
PIF_VALUE: 3
PIF_VALUE: 10
PIF_VALUE: 4
PIF_VALUE: 3
PIF_VALUE: 2
PIF_VALUE: 5
PIF_VALUE: 4
PIF_VALUE: 3
PIF_VALUE: 2
PIF_VALUE: 4
PIF_VALUE: 4
PIF_VALUE: 3
PIF_VALUE: 18
PIF_VALUE: 14
PIF_VALUE: 3
PIF_VALUE: 0
PIF_VALUE: 4
PIF_VALUE: 4
PIF_VALUE: 3
PIF_VALUE: 2
PIF_VALUE: 3
PIF_VALUE: 3
PIF_VALUE: 2
PIF_VALUE: 3
PIF_VALUE: 21
PIF_VALUE: 4
PIF_VALUE: 3
PIF_VALUE: 5
PIF_VALUE: 3
PIF_VALUE: 3
PIF_VALUE: 4
PIF_VALUE: 3
PIF_VALUE: 1
PIF_VALUE: 3
PIF_VALUE: 3
PIF_VALUE: 4
PIF_VALUE: 3
PIF_VALUE: 3
PIF_VALUE: 0
PIF_VALUE: 2
PIF_VALUE: 2
PIF_VALUE: 3
PIF_VALUE: 18
PIF_VALUE: 4
PIF_VALUE: 3
PIF_VALUE: 13
PIF_VALUE: 3
PIF_VALUE: 2
PIF_VALUE: 3
PIF_VALUE: 5
PIF_VALUE: 3
PIF_VALUE: 1
PIF_VALUE: 3
PIF_VALUE: 2
PIF_VALUE: 3

## 2019-06-10 ASSESSMENT — PAIN SCALES - GENERAL
PAINLEVEL_OUTOF10: 0
PAINLEVEL_OUTOF10: 5
PAINLEVEL_OUTOF10: 7
PAINLEVEL_OUTOF10: 3
PAINLEVEL_OUTOF10: 7
PAINLEVEL_OUTOF10: 7
PAINLEVEL_OUTOF10: 0
PAINLEVEL_OUTOF10: 0
PAINLEVEL_OUTOF10: 7
PAINLEVEL_OUTOF10: 7

## 2019-06-10 ASSESSMENT — PAIN - FUNCTIONAL ASSESSMENT: PAIN_FUNCTIONAL_ASSESSMENT: 0-10

## 2019-06-10 NOTE — ANESTHESIA POSTPROCEDURE EVALUATION
Department of Anesthesiology  Postprocedure Note    Patient: Beatrice Flor  MRN: [id number]  YOB: 1965  Date of evaluation: 6/10/2019  Time:  2:17 PM     Procedure Summary     Date:  06/10/19 Room / Location:  [facility] OR 04 / [facility] OR    Anesthesia Start:  1133 Anesthesia Stop:  1124    Procedures:       VIDEO ARTHROSCOPY RIGHT ANKLE (Right Ankle)      LATERAL LIGAMENT REPAIR AND PERONEAL TENDON REPAIR (Right Ankle) Diagnosis:       Right ankle instability      (Right ankle instability [M25.371])    Surgeon:  Marcie Hammonds MD Responsible Provider:  Pradeep Soto MD    Anesthesia Type:  general ASA Status:  2          Anesthesia Type: general    Darren Phase I: Darren Score: 9    Darren Phase II: Darren Score: 10    Last vitals: Reviewed and per EMR flowsheets.        Anesthesia Post Evaluation    Patient location during evaluation: PACU  Patient participation: complete - patient participated  Level of consciousness: awake and alert  Pain score: 0  Airway patency: patent  Nausea & Vomiting: no nausea and no vomiting  Complications: no  Cardiovascular status: hemodynamically stable  Respiratory status: acceptable  Hydration status: euvolemic

## 2019-06-10 NOTE — ANESTHESIA PRE PROCEDURE
Department of Anesthesiology  Preprocedure Note       Name:  Kati Lundberg   Age:  49 y.o.  :  1965                                          MRN:  [id number]         Date:  2019      Surgeon: Cole Lora):  Roby Wang MD    Procedure: VIDEO ARTHROSCOPY RIGHT ANKLE (Right Ankle)  LATERAL LIGAMENT REPAIR AND PERONEAL TENDON REPAIR (Right Ankle)    Medications prior to admission:   Prior to Admission medications    Medication Sig Start Date End Date Taking?  Authorizing Provider   pantoprazole (PROTONIX) 40 MG tablet Take 40 mg by mouth 2 times daily   Yes Historical Provider, MD   desvenlafaxine succinate (PRISTIQ) 50 MG TB24 extended release tablet Take 50 mg by mouth daily   Yes Historical Provider, MD   montelukast (SINGULAIR) 10 MG tablet Take 10 mg by mouth nightly   Yes Historical Provider, MD   ALBUTEROL IN Inhale into the lungs as needed   Yes Historical Provider, MD   albuterol (ACCUNEB) 0.63 MG/3ML nebulizer solution Take 1 ampule by nebulization as needed for Wheezing   Yes Historical Provider, MD   Fluticasone-Umeclidin-Vilant (TRELEGY ELLIPTA IN) Inhale 1 puff into the lungs daily   Yes Historical Provider, MD   gabapentin (NEURONTIN) 100 MG capsule TAKE 1 CAPSULE DAILY ~194V4 TAKE 3 CAPSULES AT BEDTIME  Patient taking differently: TAKE 1 CAPSULE in am and  TAKE 3 CAPSULES AT BEDTIME 19  Roby Wang MD   benzonatate (TESSALON) 200 MG capsule Take 200 mg by mouth as needed  18   Historical Provider, MD   budesonide-formoterol (SYMBICORT) 160-4.5 MCG/ACT AERO Inhale 2 puffs into the lungs 18   Historical Provider, MD   butalbital-acetaminophen-caffeine (FIORICET, ESGIC) -40 MG per tablet Take 1 tablet by mouth 1/3/18   Historical Provider, MD   diclofenac (VOLTAREN) 75 MG EC tablet Take 75 mg by mouth    Historical Provider, MD   aspirin 81 MG chewable tablet CHEW AND SWALLOW 1 TABLET BY MOUTH DAILY 18   Historical Provider, MD   buPROPion (WELLBUTRIN XL) 300 MG extended release tablet Take 300 mg by mouth every morning    Historical Provider, MD   ondansetron (ZOFRAN) 4 MG tablet Take 1 tablet by mouth every 8 hours as needed for Nausea 11/10/16   BRANDI Martinez - CNP   Multiple Vitamins-Minerals (THERAPEUTIC MULTIVITAMIN-MINERALS) tablet Take 1 tablet by mouth daily    Historical Provider, MD   Calcium Citrate-Vitamin D (CALCIUM + D PO) Take 600 mg by mouth daily     Historical Provider, MD   QUEtiapine (SEROQUEL) 25 MG tablet Take 50 mg by mouth nightly    Historical Provider, MD   tiZANidine (ZANAFLEX) 2 MG tablet Take 2 mg by mouth 2 times daily as needed 1 or 2 tabs    Historical Provider, MD   metoprolol (TOPROL-XL) 25 MG XL tablet Take 25 mg by mouth daily. Historical Provider, MD   simvastatin (ZOCOR) 40 MG tablet Take 40 mg by mouth nightly. Historical Provider, MD       Current medications:    No current facility-administered medications for this encounter.       Current Outpatient Medications   Medication Sig Dispense Refill    pantoprazole (PROTONIX) 40 MG tablet Take 40 mg by mouth 2 times daily      desvenlafaxine succinate (PRISTIQ) 50 MG TB24 extended release tablet Take 50 mg by mouth daily      montelukast (SINGULAIR) 10 MG tablet Take 10 mg by mouth nightly      ALBUTEROL IN Inhale into the lungs as needed      albuterol (ACCUNEB) 0.63 MG/3ML nebulizer solution Take 1 ampule by nebulization as needed for Wheezing      Fluticasone-Umeclidin-Vilant (TRELEGY ELLIPTA IN) Inhale 1 puff into the lungs daily      gabapentin (NEURONTIN) 100 MG capsule TAKE 1 CAPSULE DAILY ~065Q6 TAKE 3 CAPSULES AT BEDTIME (Patient taking differently: TAKE 1 CAPSULE in am and  TAKE 3 CAPSULES AT BEDTIME) 120 capsule 11    benzonatate (TESSALON) 200 MG capsule Take 200 mg by mouth as needed       budesonide-formoterol (SYMBICORT) 160-4.5 MCG/ACT AERO Inhale 2 puffs into the lungs      butalbital-acetaminophen-caffeine (FIORICET, ESGIC) -40 MG per Right     ANKLE SURGERY Left 2016    lateral ligament perineal tendon repair      SECTION      X1    CHOLECYSTECTOMY, LAPAROSCOPIC  2016    with intraoperative cholangiogram    COLONOSCOPY  06/15/2017    bx    CYSTOSCOPY  2010    flexible and rigid cystoscopy, ureteroscopy with left stone manipulation, laser lithotripsy, left ureteral stone retrieval, insertion of left ureteral stent     ELBOW SURGERY      RIGHT    ENDOSCOPY, COLON, DIAGNOSTIC  2016    ERCP    GASTRIC FUNDOPLICATION      HAND SURGERY Right 2016    thumb ligament    HYSTERECTOMY      INCONTINENCE SURGERY  2015    BLADDER TUCK    LAPAROSCOPY      LUMBAR FUSION  12    anterior posterior lumbar fusion L4-5,L5-S1    TONSILLECTOMY      UPPER GASTROINTESTINAL ENDOSCOPY  4/22/15    bx    UPPER GASTROINTESTINAL ENDOSCOPY  2016    bx esophagus-ulcer    UPPER GASTROINTESTINAL ENDOSCOPY  2016    bx esophagus       Social History:    Social History     Tobacco Use    Smoking status: Former Smoker     Packs/day: 0.20     Years: 20.00     Pack years: 4.00     Last attempt to quit: 2013     Years since quittin.1    Smokeless tobacco: Never Used   Substance Use Topics    Alcohol use: Yes     Comment: 0-1 drink per month                                Counseling given: Not Answered      Vital Signs (Current):   Vitals:    19 1613   Weight: 226 lb (102.5 kg)   Height: 5' 4\" (1.626 m)                                              BP Readings from Last 3 Encounters:   19 128/75   18 (!) 160/92   18 (!) 146/83       NPO Status:                                                                                 BMI:   Wt Readings from Last 3 Encounters:   19 214 lb 15.2 oz (97.5 kg)   18 215 lb (97.5 kg)   18 214 lb 15.2 oz (97.5 kg)     Body mass index is 38.79 kg/m².     CBC:   Lab Results   Component Value Date    WBC 6.7 2018    RBC 4.30 02/14/2018    HGB 12.5 02/14/2018    HCT 37.7 02/14/2018    MCV 87.6 02/14/2018    RDW 14.1 02/14/2018     02/14/2018       CMP:   Lab Results   Component Value Date     02/14/2018    K 4.1 02/14/2018     02/14/2018    CO2 22 02/14/2018    BUN 17 02/14/2018    CREATININE 0.7 02/14/2018    GFRAA >60 02/14/2018    GFRAA >60 11/24/2010    AGRATIO 2.1 02/14/2018    LABGLOM >60 02/14/2018    GLUCOSE 107 02/14/2018    PROT 6.5 02/14/2018    PROT 6.5 11/23/2010    CALCIUM 9.2 02/14/2018    BILITOT 0.3 02/14/2018    ALKPHOS 59 02/14/2018    AST 12 02/14/2018    ALT 13 02/14/2018       POC Tests: No results for input(s): POCGLU, POCNA, POCK, POCCL, POCBUN, POCHEMO, POCHCT in the last 72 hours. Coags:   Lab Results   Component Value Date    PROTIME 13.5 11/08/2016    INR 1.18 11/08/2016       HCG (If Applicable): No results found for: PREGTESTUR, PREGSERUM, HCG, HCGQUANT     ABGs: No results found for: PHART, PO2ART, CKQ5FZI, IRF1ESL, BEART, J6KLIARI     Type & Screen (If Applicable):  Lab Results   Component Value Date    LABABO B 01/27/2012    [facility] Positive 01/27/2012       Anesthesia Evaluation  Patient summary reviewed and Nursing notes reviewed no history of anesthetic complications:   Airway: Mallampati: II  TM distance: >3 FB   Neck ROM: full  Mouth opening: > = 3 FB Dental:          Pulmonary:   (+) asthma:                            Cardiovascular:    (+) hypertension:,                   Neuro/Psych:   Negative Neuro/Psych ROS  (+) psychiatric history:            GI/Hepatic/Renal:   (+) GERD:,           Endo/Other:                     Abdominal:           Vascular: negative vascular ROS. Anesthesia Plan      general     ASA 3    (78-year-old female presents for VIDEO ARTHROSCOPY RIGHT ANKLE (Right Ankle)  LATERAL LIGAMENT REPAIR AND PERONEAL TENDON REPAIR (Right Ankle).   Plan general anesthesia with ASA standard monitors; popliteal fossa sciatic nerve yamileth for postoperative analgesia. Questions answered. Patient agreeable with anesthetic plan.  )  Induction: intravenous. Anesthetic plan and risks discussed with patient. Plan discussed with CRNA.     Attending anesthesiologist reviewed and agrees with Pre Eval content      Abraham Jacobs MD   6/9/2019

## 2019-06-10 NOTE — ANESTHESIA PROCEDURE NOTES
Peripheral Block    Patient location during procedure: PACU  Start time: 6/10/2019 11:25 AM  End time: 6/10/2019 11:28 AM  Staffing  Anesthesiologist: Gorge Arriaga MD  Performed: anesthesiologist   Preanesthetic Checklist  Completed: patient identified, site marked, surgical consent, pre-op evaluation, timeout performed, IV checked, risks and benefits discussed, monitors and equipment checked, anesthesia consent given, oxygen available and patient being monitored  Peripheral Block  Patient position: supine  Prep: ChloraPrep  Patient monitoring: cardiac monitor, continuous pulse ox, frequent blood pressure checks and IV access  Block type: Femoral  Laterality: right  Injection technique: single-shot  Procedures: ultrasound guided  Adductor canal  Provider prep: mask and sterile gloves  Needle  Needle type: short-bevel   Needle gauge: 21 G  Needle length: 10 cm  Needle localization: ultrasound guidance  Assessment  Injection assessment: negative aspiration for heme, no paresthesia on injection and local visualized surrounding nerve on ultrasound  Paresthesia pain: none  Slow fractionated injection: yes  Hemodynamics: stable  Additional Notes  Right Adductor Canal Block Note    Indication: Postoperative analgesia upon request of the attending surgeon. Procedure: Informed consent obtained and pre-procedural timeout procedure performed. Patient supine. Right thigh externally rotated. Sterile prep. A 22g 80mm insulated regional block needle was inserted at the level of the mid-thigh and directed under ultrasound visualization into the adductor canal, with the needle tip visualized just lateral to the femoral artery. Bupivacaine 0.5% was injected under ultrasound visualization with good spread noted around the femoral artery. 10cc total volume injected. Negative aspiration for heme prior to injection and at several points during injection. Procedure tolerated well. No apparent complications.         Reason for block: post-op pain management            Ruthie Marquez MD  Buffy 10, 2019 11:46 AM

## 2019-06-10 NOTE — ANESTHESIA PROCEDURE NOTES
Peripheral Block    Patient location during procedure: pre-op  Start time: 6/10/2019 11:18 AM  End time: 6/10/2019 11:24 AM  Staffing  Anesthesiologist: Shaye Burton MD  Performed: anesthesiologist   Preanesthetic Checklist  Completed: patient identified, site marked, surgical consent, pre-op evaluation, timeout performed, IV checked, risks and benefits discussed, monitors and equipment checked, anesthesia consent given, oxygen available and patient being monitored  Peripheral Block  Patient position: prone  Prep: ChloraPrep  Patient monitoring: cardiac monitor, continuous pulse ox, frequent blood pressure checks and IV access  Block type: Sciatic  Laterality: right  Injection technique: single-shot  Procedures: ultrasound guided  Popliteal  Provider prep: mask and sterile gloves  Needle  Needle gauge: 21 G  Needle length: 10 cm  Needle localization: ultrasound guidance, nerve stimulator and anatomical landmarks  Assessment  Injection assessment: negative aspiration for heme, no paresthesia on injection and local visualized surrounding nerve on ultrasound  Paresthesia pain: none  Slow fractionated injection: yes  Hemodynamics: stable  Additional Notes  Ultrasound-Guided Right Popliteal Fossa Sciatic Nerve Block    Indication: Postoperative analgesia upon surgeon request.    Procedure: Informed consent obtained and timeout procedure performed. Patient prone. Landmarks identified. Sterile prep and drape. A 22G 80mm insulated regional block needle was inserted through the skin on the lateral aspect of the right thigh approximately 10cm cephalad of the popliteal crease. The needle was advanced in plane under ultrasound visualization laterally to medially toward the sciatic nerve proximal to the bifurcation. Bupivacaine 0.5% was then injected inside the nerve sheath under ultrasound guidance to a total volume of 30cc with frequent aspirations on the block needle that were all negative for heme.  The patient tolerated the procedure well. There were no apparent complications at the time of the block. Reason for block: post-op pain management and at surgeon's request            Farhat Ramsey.  Baron Phillip MD  Buffy 10, 2019 11:44 AM

## 2019-06-11 NOTE — OP NOTE
covered with bone  wax. The peroneus brevis underwent debridement and repair using 4-0  white Mersilene in running fashion. A portion of the tendon was  tubularized to reinforce the repair. The tear ran above the level of  the superior peroneal retinaculum and so the retinaculum was taken off  of the posterior fibula. 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 The brevis tendon also had fraying of the anterior aspect and  the retromalleolar region. This was debrided using tenotomy scissors. Once the tear was debrided, was placed back into the groove along with  the brevis and then the superior peroneal retinaculum repaired using 2-0  Vicryl suture in figure-of-eight fashion. No subluxation or dislocation  was noted. The remaining fascia was repaired using 2-0 Vicryl suture in  running fashion. Our attention was then turned to the lateral ligament  repair and a J arthrotomy was made extending from the level of plafond  anteriorly down around to the CFL. Subperiosteal dissection was  performed on the anterior and inferior aspect of the fibula. The  internal brace was then placed without difficulty at the origin and  insertion of the ATFL. This gave excellent stability to anterior drawer  and at this point the wound was copiously irrigated with normal saline  and the Brostrom performed using 2-0 Ethibond suture in horizontal  mattress fashion. Care was taken to shorten the CFL to improve the  overall stability to talar tilt. A 2-0 Vicryl suture was then used to  repair the redundant tissue. The wound was copiously irrigated with  normal saline and closed using 3-0 Vicryl suture in inverted fashion for  the subcutaneous tissue and Monocryl for the skin in running  subcuticular fashion and then ZipLine.   Wounds were dressed with dry  sterile dressing,

## 2019-06-21 ENCOUNTER — OFFICE VISIT (OUTPATIENT)
Dept: ORTHOPEDIC SURGERY | Age: 54
End: 2019-06-21
Payer: COMMERCIAL

## 2019-06-21 VITALS
DIASTOLIC BLOOD PRESSURE: 96 MMHG | BODY MASS INDEX: 38.58 KG/M2 | HEIGHT: 64 IN | WEIGHT: 225.97 LBS | SYSTOLIC BLOOD PRESSURE: 158 MMHG | HEART RATE: 62 BPM

## 2019-06-21 DIAGNOSIS — M25.371 RIGHT ANKLE INSTABILITY: ICD-10-CM

## 2019-06-21 DIAGNOSIS — M25.372 INSTABILITY OF LEFT ANKLE JOINT: Primary | ICD-10-CM

## 2019-06-21 PROCEDURE — 99024 POSTOP FOLLOW-UP VISIT: CPT | Performed by: ORTHOPAEDIC SURGERY

## 2019-06-21 PROCEDURE — 29405 APPL SHORT LEG CAST: CPT | Performed by: ORTHOPAEDIC SURGERY

## 2019-06-21 RX ORDER — OXYCODONE AND ACETAMINOPHEN 7.5; 325 MG/1; MG/1
1 TABLET ORAL EVERY 8 HOURS PRN
Qty: 21 TABLET | Refills: 0 | Status: SHIPPED | OUTPATIENT
Start: 2019-06-21 | End: 2019-06-28

## 2019-06-21 NOTE — PROGRESS NOTES
Subjective: Patient is here for follow-up of her 6/10/2019 right ankle arthroscopy lateral ligament and peroneal tendon repair. She states her pain is mild to moderate she had burning on the left side she now has burning on her right ankle 2. She is on Neurontin 300 mg at night and 100 mg in the morning. She would also like a refill of her Percocet which she is taking minimal  Objective: Physical exam shows incisions look good no evidence of infection sensations intact she does have a little bit of burning along the superficial peroneal nerve.   10 degrees of dorsiflexion 30 degrees of plantarflexion no evidence of DVT  Imaging: 3 views of the right ankle show her mortise well reduced  Assessment and plan: Patient is doing well we put her into a short leg nonweightbearing cast and I gave her a refill of her Percocet I increased her Neurontin to 400 mg in the evening and 200 in the morning

## 2019-07-12 ENCOUNTER — OFFICE VISIT (OUTPATIENT)
Dept: ORTHOPEDIC SURGERY | Age: 54
End: 2019-07-12

## 2019-07-12 VITALS — BODY MASS INDEX: 38.58 KG/M2 | WEIGHT: 225.97 LBS | HEIGHT: 64 IN

## 2019-07-12 DIAGNOSIS — M25.371 RIGHT ANKLE INSTABILITY: Primary | ICD-10-CM

## 2019-07-12 PROCEDURE — 99024 POSTOP FOLLOW-UP VISIT: CPT | Performed by: ORTHOPAEDIC SURGERY

## 2019-08-06 ENCOUNTER — OFFICE VISIT (OUTPATIENT)
Dept: ORTHOPEDIC SURGERY | Age: 54
End: 2019-08-06
Payer: COMMERCIAL

## 2019-08-06 VITALS — HEIGHT: 64 IN | BODY MASS INDEX: 38.58 KG/M2 | WEIGHT: 225.97 LBS

## 2019-08-06 DIAGNOSIS — M25.562 LEFT KNEE PAIN, UNSPECIFIED CHRONICITY: ICD-10-CM

## 2019-08-06 DIAGNOSIS — M25.371 RIGHT ANKLE INSTABILITY: Primary | ICD-10-CM

## 2019-08-06 PROCEDURE — 99024 POSTOP FOLLOW-UP VISIT: CPT | Performed by: ORTHOPAEDIC SURGERY

## 2019-08-06 PROCEDURE — 99212 OFFICE O/P EST SF 10 MIN: CPT | Performed by: ORTHOPAEDIC SURGERY

## 2019-08-06 RX ORDER — GABAPENTIN 100 MG/1
CAPSULE ORAL
Qty: 180 CAPSULE | Refills: 2 | Status: SHIPPED | OUTPATIENT
Start: 2019-08-06 | End: 2019-10-14 | Stop reason: SDUPTHER

## 2019-09-03 ENCOUNTER — OFFICE VISIT (OUTPATIENT)
Dept: ORTHOPEDIC SURGERY | Age: 54
End: 2019-09-03

## 2019-09-03 VITALS — BODY MASS INDEX: 38.58 KG/M2 | WEIGHT: 225.97 LBS | HEIGHT: 64 IN

## 2019-09-03 DIAGNOSIS — M25.371 RIGHT ANKLE INSTABILITY: Primary | ICD-10-CM

## 2019-09-03 DIAGNOSIS — M70.50 PES ANSERINE BURSITIS: ICD-10-CM

## 2019-09-03 PROCEDURE — 99024 POSTOP FOLLOW-UP VISIT: CPT | Performed by: ORTHOPAEDIC SURGERY

## 2019-10-14 RX ORDER — GABAPENTIN 100 MG/1
CAPSULE ORAL
Qty: 180 CAPSULE | Refills: 10 | Status: SHIPPED | OUTPATIENT
Start: 2019-10-14 | End: 2020-09-11

## 2019-12-06 ENCOUNTER — OFFICE VISIT (OUTPATIENT)
Dept: ORTHOPEDIC SURGERY | Age: 54
End: 2019-12-06
Payer: COMMERCIAL

## 2019-12-06 VITALS — HEIGHT: 64 IN | WEIGHT: 225.97 LBS | BODY MASS INDEX: 38.58 KG/M2

## 2019-12-06 DIAGNOSIS — M25.371 RIGHT ANKLE INSTABILITY: Primary | ICD-10-CM

## 2019-12-06 PROCEDURE — G8427 DOCREV CUR MEDS BY ELIG CLIN: HCPCS | Performed by: ORTHOPAEDIC SURGERY

## 2019-12-06 PROCEDURE — 99212 OFFICE O/P EST SF 10 MIN: CPT | Performed by: ORTHOPAEDIC SURGERY

## 2019-12-06 PROCEDURE — G8484 FLU IMMUNIZE NO ADMIN: HCPCS | Performed by: ORTHOPAEDIC SURGERY

## 2019-12-06 PROCEDURE — 3017F COLORECTAL CA SCREEN DOC REV: CPT | Performed by: ORTHOPAEDIC SURGERY

## 2019-12-06 PROCEDURE — 1036F TOBACCO NON-USER: CPT | Performed by: ORTHOPAEDIC SURGERY

## 2019-12-06 PROCEDURE — G8417 CALC BMI ABV UP PARAM F/U: HCPCS | Performed by: ORTHOPAEDIC SURGERY

## 2020-05-11 ENCOUNTER — TELEPHONE (OUTPATIENT)
Dept: ORTHOPEDIC SURGERY | Age: 55
End: 2020-05-11

## 2020-05-11 NOTE — TELEPHONE ENCOUNTER
GAVE [facility] MEDICAL RECORDS FROM 11/1/18 TO THE PRESENT TO DISHA RICHARDSON TO SCAN INTO [facility] FOR [facility]SA

## 2021-01-12 RX ORDER — GABAPENTIN 100 MG/1
CAPSULE ORAL
Qty: 150 CAPSULE | Refills: 10 | Status: SHIPPED | OUTPATIENT
Start: 2021-01-12 | End: 2021-02-12

## 2021-06-14 ENCOUNTER — HOSPITAL ENCOUNTER (OUTPATIENT)
Dept: PHYSICAL THERAPY | Age: 56
Setting detail: THERAPIES SERIES
Discharge: HOME | End: 2021-06-14
Payer: COMMERCIAL

## 2021-06-14 NOTE — FLOWSHEET NOTE
Physical Therapy  Cancellation/No-show Note  Patient Name:  Madhavi Li  :  1965   Date:  2021  Cancels to Date: 0  No-shows to Date: 1    For today's appointment patient:  []  Cancelled  []  Rescheduled appointment  [x]  No-show     Reason given by patient:  []  Patient ill  []  Conflicting appointment  []  No transportation    []  Conflict with work  []  No reason given  []  Other:     Comments:  Staff called and LM  Electronically signed by:  Tanya Colbert, [address]

## 2021-06-22 ENCOUNTER — APPOINTMENT (OUTPATIENT)
Dept: PHYSICAL THERAPY | Age: 56
End: 2021-06-22
Payer: COMMERCIAL

## 2021-06-25 ENCOUNTER — HOSPITAL ENCOUNTER (OUTPATIENT)
Dept: PHYSICAL THERAPY | Age: 56
Setting detail: THERAPIES SERIES
Discharge: HOME | End: 2021-06-25
Payer: COMMERCIAL

## 2021-06-25 PROCEDURE — 97110 THERAPEUTIC EXERCISES: CPT

## 2021-06-25 PROCEDURE — 97016 VASOPNEUMATIC DEVICE THERAPY: CPT

## 2021-06-25 PROCEDURE — 97163 PT EVAL HIGH COMPLEX 45 MIN: CPT

## 2021-06-25 NOTE — PROGRESS NOTES
[address] [facility], [facility], [address]  Phone: [phone]                                                    Physical Therapy Certification    We had the pleasure of evaluating the following patient for physical therapy services at [address] [place] [address]. A summary of our findings can be found in the initial assessment below. This includes our plan of care. If you have any questions or concerns regarding these findings, please do not hesitate to contact me at the office phone number checked above. Thank you for the referral.       Physician Signature:_______________________________Date:__________________  By signing above (or electronic signature), therapists plan is approved by physician     [facility] PHYSICAL THERAPY EVALUATION    Patient: Lisa Peguero   : 1965   MRN: [id number]     Medical Diagnosis:  Traumatic tear of left rotator cuff, unspecified tear extent, initial encounter    Tear of meniscus of left knee as current injury, unspecified meniscus, unspecified tear type, initial encounter    Treatment Diagnosis: limited ROM, weakness, pain, swelling, poor posture, decreased functional status     Onset Date: 21     Referral Date:  21     Referring Physician: Dwight Morris MD        Visits Allowed/Insurance/Certification Information:  Roly Meyer- 30 visits/year    Restrictions/Precautions: depression, anxiety, HTN    C-SSRS Triggered by Intake questionnaire (Past 2 wk assessment):   [] No, Questionnaire did not trigger screening.    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 Her brother helps her care for her dad when able as he works. Health History reviewed with pt:   [x]Yes   []No      PMH:  Asthma, OA, HLD, HTN, acid reflux, chronic back pain with radicular s/s into R leg, monica esophagus, depression, anxiety, prior suicide attempt in , diverticulosis, R elbow surgery, neuropathy in B feet, B ankle surgery, tonsillectomy, adenoidectomy, hysterectomy, , kidney stones with lithotripsy, R hand surgery, bladder tuck, lumbar spinal fusion L4-5, L5-S1, vertigo, migraines/headaches, abdominal for necrotic tissue mass 2020    SUBJECTIVE FINDINGS        History of Present Illness:   Patient reports she was pumping gas and turned too quickly, due to her vertigo, she fell landing on her left side injuring her L shoulder and knee. She went to ED, was treated and released. She followed up with ortho who got MRI's as noted below. 21 MRI L SHOULDER- Impression:   1. Extremely limited study due to patient motion. 2. Suspected focal full-thickness tear involving the anterior and mid fibers of the supraspinatus tendon, however due to motion this is not definite. 3. Subacromial/subdeltoid bursitis. 4. Marked marrow edema in the greater tuberosity which could reflect a bone contusion. 5. Possible tearing of the superior labrum. 6. Tenosynovitis surrounding the long head biceps tendon. 21 MRI L KNEE- Impression:  1. Fluid signal abnormality within the substance of the anterior cruciate ligament either reflecting partial-thickness tearing or cystic degeneration. 2. Tearing in the anterior horn lateral meniscus. 3. Tearing of the body of the lateral meniscus. 4. Focal area of moderate grade chondral fissuring in the central trochlea. She got cortisone injections to her L shoulder and knee. She got good relief in her L knee pain following the injection, but no relief noted in the left shoulder.   MD wanted to do surgery on both areas, but patient's daughter is getting  7/30/21, and she does not have time for recovery before the wedding. She would like to try conservative treatment before surgical options. Scheduled to follow up with MD 8/4/21. Pain       Patient describes pain to be L shoulder pain is constant, achy pain, lateral upper arm, posterior shoulder, L knee pain is intermittent in medial joint line and posterior capsule. Patient reports pain is  3-4/10 pain L shoulder and 1-2/10 L knee at present; and  7-8/10 L shoulder, and 5/10 L knee pain at its worst.  Worsened by:  Shoulder:  Any kind of use, reaching, driving, overhead activity  L knee: prolonged walking >30 min, standing >30 min, stairs, squatting, kneeling    Improved by:  Support under arm, rest, ice, tylenol    Pt. reports pain with coughing, sneezing and laughing:   []Yes   [x]No    []NA     Current Functional Limitations:   []Yes   []No  Functional Complaints:  Performing most normal tasks working through the pain, and requiring more rest breaks. Having difficulty taking care of her father, especially transfers. PLOF:   [x]No functional limitations   []Pt with previous functional limitations  Pt's sleep is affected? [x]Yes, not sleeping well due to pain  []No         Patient goal for therapy:  \"improve ROM of L shoulder and knee\"      OBJECTIVE FINDINGS      Gait/Steps/Balance       [x]Gait WNL unless otherwise noted below:                             Stairs:   Alt with B rails    Balance:  SLS  L 20, R15 sec    Posture:   [x]Forward head  [x]Forward flexed trunk   []Pronounced CT junction    [x]Increased thoracic kyphosis   []Increased lumbar lordosis   []Scoliosis   []Swayback  []Decreased WB on   []R   []L   []Scapular winging  []Other:       Range of Motion   [x]Cervical Spine   []Thoracic Spine     Range Tested AROM PROM MMT/Resisted Tests   Flexion WNL  5/5   Extension \"  5/5   Sidebending Left \"  5/5   Sidebending Right \"  5/5 Rotation Left \"  5/5   Rotation Right \"  5/5   Comments:    UE Range of Motion/Strength Testing     [x]All ROM WNL except as marked below   [x]All strength WNL (5/5) except as marked below (measured out of 5)     Range Tested AROM PROM Resisted Comments   *denotes pain Left Right Left Right Left Right    Shoulder Flexion 92* 170   2/5 5/5    Shoulder Extension 41* 70   4+/5 5/5    Shoulder Abduction 60* 180   5/5 5/5    Shoulder Adduction           Shoulder IR 45* 70   2/5 5/5    Shoulder ER 38* 100   2/5 5/5    Elbow Flexion WNL WNL   5/5 5/5    Elbow Extension  \" \"   5/5 5/5    Pronation \" \"   5/5 5/5    Supination \" \"   5/5 5/5    Wrist Flexion \" \"   5/5 5/5    Knee flex 125* 132   4+/5 5/5    Knee ext  0 +2   4+/5 5/5    Hip abd     4+/5 5/5    Hip ext      4/5 5/5    Hip flex     5/5 5/5        Girth Left Right   2\" above  Mid patella 50.0 cm 48.0   Mid patella 47.0 43.0   2\" below 40.5 39.0                    Reflexes/Cervical Strength    [x]All reflexes WNL (2+) or negative test except as marked below   [x]All strength WNL (5/5) except as marked below  Reflex Left Right Strength Strength   Biceps (C5,6) 2 2 Anterior cervical flexors    Brachioradialis (C6) 2 2 Lateral musculature    Triceps (C7) 2 2     Abductor Digiti Minimi (C8,T1)       Quadriceps (L3,4) 2 2     Achilles (S1,2) 2 2     Ankle clonus       Torie's reflex        Babinski's reflex           Dermatomes/Myotomes     [x]All dermatomes WNL for light touch     Special Tests- UE seated     Special Test Findings   Empty can test/supraspinatus []Neg   []Pos R   [x]Pos L   []NT   Drop arm test [x]Neg   []Pos R   []Pos L   []NT   Neer's impingement []Neg   []Pos R   [x]Pos L   []NT   Morgan-Zion []Neg   []Pos R   [x]Pos L   []NT   Elbow varus stress []Neg   []Pos R   []Pos L   []NT   Elbow valgus stress  []Neg   []Pos R   []Pos L   []NT   Tinel's sign  []Neg   []Pos R   []Pos L   []NT  []Elbow   []Wrist    Finkelstein's test []Neg   []Pos R []Pos L   []NT   Phalen's test  []Neg   []Pos R   []Pos L   []NT   Other []Neg   []Pos R   []Pos L   []NT   Other  []Neg   []Pos R   []Pos L   []NT     Palpation     Patient reported tenderness with palpation:  [x]Yes   []No   []NA  Location:  L shoulder proximal bicep tendon, supraspinatus, infraspinatus, L knee medial joint line and posterior capsule  PT notes warmth:  []Yes   [x]No   []NA  Location:   PT notes increased muscle tone:   [x]Yes   []No   []NA  Location:  B UT and levator scap  PT notes crepitus with palpation:   []Yes   [x]No   []NA  Location:   Ligament tenderness/provocation:   []Yes   []No   [x]NA  Location:    PT notes decreased scar mobility:   []Yes   []No   [x]NA    Appearance    PT notes swelling:   [x]Yes   []No   []NA  Location:   L shoulder and knee  PT notes redness:  []Yes   [x]No   []NA  Location:   PT notes drainage:   []Yes   [x]No   []NA  Location:      Special Tests-knee    Special Test Findings   Ant drawer [x]Neg   []Pos R   []Pos L   []NT   varus [x]Neg   []Pos R   []Pos L   []NT   valgus [x]Neg   []Pos R   []Pos L   []NT   harrison []Neg   []Pos R   [x]Pos L   []NT   hamstring []Neg   []Pos R -20   [x]Pos L -30   []NT    []Neg   []Pos R   []Pos L   []NT   Other []Neg   []Pos R   []Pos L   []NT   Other  []Neg   []Pos R   []Pos L   []NT     Co-morbidities/Complexities (which will affect course of rehabilitation):  []None           Arthritic conditions   []Rheumatoid arthritis (M05.9)  [x]Osteoarthritis (M19.91)   Cardiovascular conditions   [x]Hypertension (I10)  [x]Hyperlipidemia (E78.5)  []Angina pectoris (I20)  []Atherosclerosis (I70)   Musculoskeletal conditions   []Disc pathology   []Congenital spine pathologies   [x]Prior surgical intervention  []Osteoporosis (M81.8)  []Osteopenia (M85.8)   Endocrine conditions   []Hypothyroid (E03.9)  []Hyperthyroid Gastrointestinal conditions   []Constipation (Q53.91)   Metabolic conditions   []Morbid obesity (E66.01)  []Diabetes type 1(E10.65) or 2 (E11.65)   [x]Neuropathy (G60.9)     Pulmonary conditions   [x]Asthma (J45)  []Coughing   []COPD (J44.9)   Psychological Disorders  [x]Anxiety (F41.9)  [x]Depression (F32.9)   []Other:   []Other:          Functional Outcome Measure:   []NA  Measure Used: LEFS, quickdash  Date Assessed: 6/25/21  Score:  42/80=47% (LEFS),  39=64% on quickdash    ASSESSMENT:  Patient presents with s/s consistent with Dx. Recommend PT treatment to address problem list so that the patient may return to regular activities without any functional limitations noted. Functional Impairments   []Noted spinal or UE joint hypomobility   []Noted spinal or UE joint hypermobility   [x]Decreased UE functional ROM   [x]Decreased UE functional strength   []Abnormal reflexes/sensation/myotomal/dermatomal deficits   [x]Decreased RC/scapular/core strength and neuromuscular control   []other:      Functional Activity Limitations (from functional questionnaire and intake)   [x]Reduced ability to tolerate prolonged functional positions   [x]Reduced ability or difficulty with changes of positions or transfers between positions   [x]Reduced ability to maintain good posture and demonstrate good body mechanics with sitting, bending, and lifting   [x] Reduced ability or tolerance with driving and/or computer work   [x]Reduced ability to sleep   [x]Reduced ability to perform lifting, reaching, carrying tasks   [x]Reduced ability to tolerate impact through UE   [x]Reduced ability to reach behind back   [x]Reduced ability to  or hold objects   [x]Reduced ability to throw or toss an object   []other:    Participation Restrictions   [x]Reduced participation in self care activities   [x]Reduced participation in home management activities   []Reduced participation in work activities   []Reduced participation in social activities. []Reduced participation in sport/recreation activities.     Classification:    []Signs/symptoms consistent with patients Functional Deficits. [] Progressing: [] Met: [] Not Met: [] Adjusted   4. Patient will return to all transfers, work activities, and functional activities without increased symptoms or restriction. [] Progressing: [] Met: [] Not Met: [] Adjusted   5. Patient will have 0-3/10 pain with ADL's.  [] Progressing: [] Met: [] Not Met: [] Adjusted   6. Patient stated goal: Sleep through the night without waking due to pain. [] Progressing: [] Met: [] Not Met: [] Adjusted    PLAN OF CARE     To see patient  1-2 x/week for 8  weeks for the following treatment interventions:  [x]Therapeutic Exercise  [x]Modalities of Choice: []Heat []Cold []US []Estim []Ionto []Vaso-pneumatic device  []Mechanical Traction   [x]Manual Therapy  [x]Neuromuscular Re-Education  [x]Gait Training   [x]Therapeutic Activity  []Other     Physical Therapy Evaluation Complexity Justification  [] EVAL (LOW) 00973 (typically 20 minutes face-to-face)  [] EVAL (MOD) 89080 (typically 30 minutes face-to-face)  [x] EVAL (HIGH) 95145 (typically 45 minutes face-to-face)  [] RE-EVAL     Thank you for the referral of this patient.       Timed Code Treatment Minutes: 45    minutes     Total Treatment Time: 90   minutes    Nanette Fink PT MPT [id number]

## 2021-06-25 NOTE — FLOWSHEET NOTE
[address] [place] [address] and Sports Rehabilitation, [address]  Phone [phone]  Fax [phone]    Outpatient Physical Therapy     [x] Daily Treatment Note   [] Progress Note   [] Discharge Note    Date:  6/25/2021    Patient Name:  Charisse Shepherd         YOB: 1965    Medical Diagnosis:  Traumatic tear of left rotator cuff, unspecified tear extent, initial encounter    Tear of meniscus of left knee as current injury, unspecified meniscus, unspecified tear type, initial encounter     Treatment Diagnosis: limited ROM, weakness, pain, swelling, poor posture, decreased functional status                                  Onset Date:  5/19/21               Referral Date:  6/9/21             Referring Physician: Dyllan Mclean MD                                                Visits Allowed/Insurance/Certification Information:  Salma Aceves- 30 visits/year     Restrictions/Precautions: depression, anxiety, HTN    Plan of care sent to provider:   []NA   [x]Faxed   []Co-signature       Plan of care signed:    [x]NA   []Yes   []No      Progress report will be due (10 Rx or 30 days whichever is less): 1/80/80     Recertification will be due (POC Duration  / 90 days whichever is less):  9/25/21     Visit# / total visits:     Visit # Insurance Allowable Auth Required   In Person 1/16 30 []  Yes     [x]  No    [facility] 0  []  Yes     []  No    Total 1/16       Plan for Next Session:  Add for knee: nustep, forward step ups, lateral step ups, prone HS curls, LAQ  For shoulder:  PROM, ceiling punches, prone scap strengthening ex      Subjective:  See eval     Pain level:   AT EVAL: Patient describes pain to be L shoulder pain is constant, achy pain, lateral upper arm, posterior shoulder, L knee pain is intermittent in medial joint line and posterior capsule.   Patient reports pain is  3-4/10 pain L shoulder and 1-2/10 L knee at present; and  7-8/10 L shoulder, per patient tolerance, in order to prevent re-injury. []? Progressing: []? Met: []? Not Met: []? Adjusted            2. Patient will have a decrease in pain to facilitate improvement in movement, function, and ADLs as indicated by Functional Deficits. []? Progressing: []? Met: []? Not Met: []? Adjusted               Long Term Goals:  8 weeks  1. Disability index score of 20% or less for the QuickDash and LEFS to assist with reaching prior level of function. []? Progressing: []? Met: []? Not Met: []? Adjusted           2. Patient will demonstrate increased AROM L shoulder and L Knee to WNL to allow for proper joint functioning as indicated by patients Functional Deficits. []? Progressing: []? Met: []? Not Met: []? Adjusted            3. Patient will demonstrate an increase in strength to 4+/5 or greater in L UE and LE to allow for proper functional mobility as indicated by patients Functional Deficits. []? Progressing: []? Met: []? Not Met: []? Adjusted            4. Patient will return to all transfers, work activities, and functional activities without increased symptoms or restriction. []? Progressing: []? Met: []? Not Met: []? Adjusted            5. Patient will have 0-3/10 pain with ADL's.  []? Progressing: []? Met: []? Not Met: []? Adjusted            6. Patient stated goal: Sleep through the night without waking due to pain. []? Progressing: []? Met: []? Not Met: []?  Adjusted    Prognosis: [x]Good   []Fair   []Poor    Patient Requires Follow-up:  [x]Yes  []No    Plan: [x]Plan of care initiated     []Continue per plan of care    [] Alter current plan (see comments)    []Hold pending MD visit []Discharge    Charges:  Timed Code Treatment Minutes: 45   Total Treatment Minutes:  13 3972 [facility] time for each procedure?:  TE TIME:  NMR TIME:  MANUAL TIME:  UNTIMED MINUTES:       [] EVAL (LOW) 18690 (typically 20 minutes face-to-face)  [] EVAL (MOD) 21724 (typically 30 minutes face-to-face)  [x] EVAL (HIGH) 28221 (typically 45 minutes face-to-face)  [] RE-EVAL     [x] CC(93027) x3     [] IONTO  [] NMR (89353) x     [x] VASO  [] Manual (47514) x     [] Other:  [] TA x      [] Mech Traction (89568)  [] ES(attended) (24169)     [] ES (un) (66311): Medicare Cap total YTD:   [x]N/A    Electronically signed by:  Kimberly Aparicio PT, MPT [id number]    Note: If patient does not return for scheduled/ recommended follow up visits, this note will serve as a discharge from care along with most recent update on progress.

## 2021-06-29 ENCOUNTER — APPOINTMENT (OUTPATIENT)
Dept: PHYSICAL THERAPY | Age: 56
End: 2021-06-29
Payer: COMMERCIAL

## 2021-07-01 ENCOUNTER — HOSPITAL ENCOUNTER (OUTPATIENT)
Dept: PHYSICAL THERAPY | Age: 56
Setting detail: THERAPIES SERIES
Discharge: HOME | End: 2021-07-01
Payer: COMMERCIAL

## 2021-07-01 NOTE — FLOWSHEET NOTE
Physical Therapy  Cancellation/No-show Note  Patient Name:  Haleigh Priest  :  1965   Date:  2021  Cancels to Date: 0  No-shows to Date: 1    For today's appointment patient:  []  Cancelled  []  Rescheduled appointment  [x]  No-show     Reason given by patient:  []  Patient ill  []  Conflicting appointment  []  No transportation    []  Conflict with work  []  No reason given  []  Other:     Comments:  Staff LVM for pt  Electronically signed Maida Guardado, [id number]

## 2021-07-06 ENCOUNTER — HOSPITAL ENCOUNTER (OUTPATIENT)
Dept: PHYSICAL THERAPY | Age: 56
Setting detail: THERAPIES SERIES
Discharge: HOME | End: 2021-07-06
Payer: COMMERCIAL

## 2021-07-06 PROCEDURE — 97110 THERAPEUTIC EXERCISES: CPT

## 2021-07-06 PROCEDURE — 97140 MANUAL THERAPY 1/> REGIONS: CPT

## 2021-07-06 PROCEDURE — 97016 VASOPNEUMATIC DEVICE THERAPY: CPT

## 2021-07-06 NOTE — FLOWSHEET NOTE
[address] [facility], [address]  Phone [phone]  Fax [phone]    Outpatient Physical Therapy     [x] Daily Treatment Note   [] Progress Note   [] Discharge Note    Date:  7/6/2021    Patient Name:  Alyssa Bauman         YOB: 1965    Medical Diagnosis:  Traumatic tear of left rotator cuff, unspecified tear extent, initial encounter    Tear of meniscus of left knee as current injury, unspecified meniscus, unspecified tear type, initial encounter     Treatment Diagnosis: limited ROM, weakness, pain, swelling, poor posture, decreased functional status                                  Onset Date:  5/19/21               Referral Date:  6/9/21             Referring Physician: Deshaun Nagel MD                                                Visits Allowed/Insurance/Certification Information:  [facility]- 30 visits/year     Restrictions/Precautions: depression, anxiety, HTN    Plan of care sent to provider:   []NA   [x]Faxed   []Co-signature       Plan of care signed:    []NA   [x]Yes   []No      Progress report will be due (10 Rx or 30 days whichever is less): 7/92/14     Recertification will be due (POC Duration  / 90 days whichever is less):  9/25/21     Visit# / total visits:     Visit # Insurance Allowable Auth Required   In Person 2/16 30 []  Yes     [x]  No    [facility] 0  []  Yes     []  No    Total 2/16       Plan for Next Session:  Add for knee: nustep, forward step ups, lateral step ups, prone HS curls,   For shoulder:  PROM, ceiling punches, prone scap strengthening ex      Subjective:  Reports her father has been in the hospital and she has been there and has also had to lift on him and has irritated shoulder and her back.   Reports knee is less than any of the other pains today     Pain level: Shoulder 4-6/10,  Low back since yesterday 5-6/10,  L knee 2-6/10  AT EVAL: Patient describes pain to be L shoulder pain is constant, achy pain, lateral upper arm, posterior shoulder, L knee pain is intermittent in medial joint line and posterior capsule. Patient reports pain is  3-4/10 pain L shoulder and 1-2/10 L knee at present; and  7-8/10 L shoulder, and 5/10 L knee pain at its worst.  Worsened by:  Shoulder:  Any kind of use, reaching, driving, overhead activity  L knee: prolonged walking >30 min, standing >30 min, stairs, squatting, kneeling        Objective:       Exercises:    Exercises in bold performed in department today. Items not bolded are carried forward from prior visits for continuity of the record. Exercise/Equipment Resistance/Repetitions Issued for HEP     nustep nv not today due to back pain      QS/GS/HS/AP x10 each      4-way hip SLR (flex, ext, abd, add) x10 each unable due to back pain today       Seated HS stretch 30 sec/ x3      LAQ x10                                                                                                  L shoulder exercises  AAROM with cane (flex, abd, ER) x5 each direction  Unable to do abd today    Supine scap retraction and depressions x10 ea supine    Seated axial elongation x10    codmans x10  Not able since yest due to back pain    Unable to progress ex today due to pain                                                    Therapeutic Exercise/Home Exercise Program:   x30 min. Instructed in HEP with handout given. Group Therapy:    0 minutes    Therapeutic Activity:  0 minutes     Gait: 0 minutes    Neuromuscular Re-Education:  0 minutes      Canalith Repositioning Procedure:  0 minutes    Manual Therapy:  10 minutes  PROM L shoulder    Modalities: 15 minutes   [x] GAME READY (VASO)- for significant edema, swelling, pain control to left knee with LE elevated, 34 deg, min pressure  CP to L shoulder.      Functional Outcome Measure:   []NA  Measure Used: LEFS, quickdash  Date Assessed: 6/25/21  Score:  42/80=47% (LEFS),  39=64% on quickdash Assessment/Treatment/Activity Tolerance:    Patients response to treatment:   [x]Patient tolerated treatment well with no adverse reactions noted    []Patient limited by fatigue   []Patient limited by pain    []Patient limited by other medical complications   []Other:     Goals:   Progress towards goals:  Goals established on 6/25/21  GOALS:  Short Term Goals:  2 weeks  1. Independent in HEP and progression per patient tolerance, in order to prevent re-injury. []? Progressing: []? Met: []? Not Met: []? Adjusted            2. Patient will have a decrease in pain to facilitate improvement in movement, function, and ADLs as indicated by Functional Deficits. []? Progressing: []? Met: []? Not Met: []? Adjusted               Long Term Goals:  8 weeks  1. Disability index score of 20% or less for the QuickDash and LEFS to assist with reaching prior level of function. []? Progressing: []? Met: []? Not Met: []? Adjusted           2. Patient will demonstrate increased AROM L shoulder and L Knee to WNL to allow for proper joint functioning as indicated by patients Functional Deficits. []? Progressing: []? Met: []? Not Met: []? Adjusted            3. Patient will demonstrate an increase in strength to 4+/5 or greater in L UE and LE to allow for proper functional mobility as indicated by patients Functional Deficits. []? Progressing: []? Met: []? Not Met: []? Adjusted            4. Patient will return to all transfers, work activities, and functional activities without increased symptoms or restriction. []? Progressing: []? Met: []? Not Met: []? Adjusted            5. Patient will have 0-3/10 pain with ADL's.  []? Progressing: []? Met: []? Not Met: []? Adjusted            6. Patient stated goal: Sleep through the night without waking due to pain. []? Progressing: []? Met: []? Not Met: []?  Adjusted    Prognosis: [x]Good   []Fair   []Poor    Patient Requires Follow-up:  [x]Yes  []No    Plan: []Plan of care initiated     [x]Continue per plan of care    [] Alter current plan (see comments)    []Hold pending MD visit []Discharge    Charges:  Timed Code Treatment Minutes: 40   Total Treatment Minutes:  55   [facility] time for each procedure?:  TE TIME:  NMR TIME:  MANUAL TIME:  UNTIMED MINUTES:       [] EVAL (LOW) 36765 (typically 20 minutes face-to-face)  [] EVAL (MOD) 82742 (typically 30 minutes face-to-face)  [] EVAL (HIGH) 87223 (typically 45 minutes face-to-face)  [] RE-EVAL     [x] GE(24771) x2     [] IONTO  [] NMR (31030) x     [x] VASO  [x] Manual (00176) x 1    [] Other:  [] TA x      [] Mech Traction (44713)  [] ES(attended) (81898)     [] ES (un) (86943): Medicare Cap total YTD:   [x]N/A    Electronically signed by:  Cecilio Geller [id number]    Note: If patient does not return for scheduled/ recommended follow up visits, this note will serve as a discharge from care along with most recent update on progress.

## 2021-07-09 ENCOUNTER — HOSPITAL ENCOUNTER (OUTPATIENT)
Dept: PHYSICAL THERAPY | Age: 56
Setting detail: THERAPIES SERIES
Discharge: HOME | End: 2021-07-09
Payer: COMMERCIAL

## 2021-07-09 NOTE — FLOWSHEET NOTE
Physical Therapy  Cancellation/No-show Note  Patient Name:  Ene Judge  :  1965   Date:  2021  Cancels to Date: 1  No-shows to Date: 1    For today's appointment patient:  [x]  Cancelled  []  Rescheduled appointment  []  No-show     Reason given by patient:  []  Patient ill  []  Conflicting appointment  []  No transportation    []  Conflict with work  []  No reason given  []  Other:     Comments:   Not feeling well  Electronically signed by:  David Miranda [id number] no

## 2021-07-12 ENCOUNTER — HOSPITAL ENCOUNTER (OUTPATIENT)
Dept: PHYSICAL THERAPY | Age: 56
Setting detail: THERAPIES SERIES
Discharge: HOME | End: 2021-07-12
Payer: COMMERCIAL

## 2021-07-12 PROCEDURE — 97140 MANUAL THERAPY 1/> REGIONS: CPT

## 2021-07-12 PROCEDURE — 97110 THERAPEUTIC EXERCISES: CPT

## 2021-07-12 NOTE — FLOWSHEET NOTE
[address] [facility], [address]  Phone [phone]  Fax [phone]    Outpatient Physical Therapy     [x] Daily Treatment Note   [] Progress Note   [] Discharge Note    Date:  7/12/2021    Patient Name:  Nehemias Clark         YOB: 1965    Medical Diagnosis:  Traumatic tear of left rotator cuff, unspecified tear extent, initial encounter    Tear of meniscus of left knee as current injury, unspecified meniscus, unspecified tear type, initial encounter     Treatment Diagnosis: limited ROM, weakness, pain, swelling, poor posture, decreased functional status                                  Onset Date:  5/19/21               Referral Date:  6/9/21             Referring Physician: Kacie Lewis MD                                                Visits Allowed/Insurance/Certification Information:  [facility]- 30 visits/year     Restrictions/Precautions: depression, anxiety, HTN    Plan of care sent to provider:   []NA   [x]Faxed   []Co-signature       Plan of care signed:    []NA   [x]Yes   []No      Progress report will be due (10 Rx or 30 days whichever is less): 0/59/30     Recertification will be due (POC Duration  / 90 days whichever is less):  9/25/21     Visit# / total visits:     Visit # Insurance Allowable Auth Required   In Person 3/16 30 []  Yes     [x]  No    [facility] 0  []  Yes     []  No    Total 3/16       Plan for Next Session:  Add for knee: nustep,  prone HS curls,   For shoulder:    prone scap strengthening ex      Subjective:  Reports her shoulder is bad with muscle spasm into upper trap and neck. Low back still flared up and knee is doing better with pain reports.     Pain level: Shoulder 4-5/10,  Low back 4-5/10,  L knee 2/10  AT EVAL: Patient describes pain to be L shoulder pain is constant, achy pain, lateral upper arm, posterior shoulder, L knee pain is intermittent in medial joint line and posterior capsule. Patient reports pain is  3-4/10 pain L shoulder and 1-2/10 L knee at present; and  7-8/10 L shoulder, and 5/10 L knee pain at its worst.  Worsened by:  Shoulder:  Any kind of use, reaching, driving, overhead activity  L knee: prolonged walking >30 min, standing >30 min, stairs, squatting, kneeling        Objective:       Exercises:    Exercises in bold performed in department today. Items not bolded are carried forward from prior visits for continuity of the record. Exercise/Equipment Resistance/Repetitions Issued for HEP     nustep S9 L4 x 3'      QS/GS/HS/AP x10 each x     4-way hip SLR (flex, ext, abd, add) x10 each unable due to back pain today  x     Seated HS stretch 30 sec/ x3 x     LAQ x10 x     forward step ups 4\" 2x10      lateral step ups 4\" 2x10                                                                                    L shoulder exercises  AAROM with cane (flex, abd, ER) x5 each direction  Unable to do abd today x   Supine scap retraction and depressions x10 ea supine x   Seated axial elongation x10 x   codmans x10   x        ceiling punches 1x10 w/assist  In dept                                              Therapeutic Exercise/Home Exercise Program:   x30 min. Instructed in HEP with handout given. Shown how to use towel roll under arm for shoulder support as needed    Group Therapy:    0 minutes    Therapeutic Activity:  0 minutes     Gait: 0 minutes    Neuromuscular Re-Education:  0 minutes      Canalith Repositioning Procedure:  0 minutes    Manual Therapy:  13 minutes  PROM L shoulder  STM L shoulder and trap    Modalities: oversite today plus pt pain low        minutes    [x] GAME READY (VASO)- for significant edema, swelling, pain control to left knee with LE elevated, 34 deg, min pressure  CP to L shoulder.      Functional Outcome Measure:   []NA  Measure Used: LEFS, quickdash  Date Assessed: 6/25/21  Score:  42/80=47% (LEFS),  39=64% on quickdash Assessment/Treatment/Activity Tolerance:    Patients response to treatment:   []Patient tolerated treatment well with no adverse reactions noted    []Patient limited by fatigue   [x]Patient limited by pain    []Patient limited by other medical complications   []Other:     Goals:   Progress towards goals:  Goals established on 6/25/21  GOALS:  Short Term Goals:  2 weeks  1. Independent in HEP and progression per patient tolerance, in order to prevent re-injury. []? Progressing: []? Met: []? Not Met: []? Adjusted            2. Patient will have a decrease in pain to facilitate improvement in movement, function, and ADLs as indicated by Functional Deficits. []? Progressing: []? Met: []? Not Met: []? Adjusted               Long Term Goals:  8 weeks  1. Disability index score of 20% or less for the QuickDash and LEFS to assist with reaching prior level of function. []? Progressing: []? Met: []? Not Met: []? Adjusted           2. Patient will demonstrate increased AROM L shoulder and L Knee to WNL to allow for proper joint functioning as indicated by patients Functional Deficits. []? Progressing: []? Met: []? Not Met: []? Adjusted            3. Patient will demonstrate an increase in strength to 4+/5 or greater in L UE and LE to allow for proper functional mobility as indicated by patients Functional Deficits. []? Progressing: []? Met: []? Not Met: []? Adjusted            4. Patient will return to all transfers, work activities, and functional activities without increased symptoms or restriction. []? Progressing: []? Met: []? Not Met: []? Adjusted            5. Patient will have 0-3/10 pain with ADL's.  []? Progressing: []? Met: []? Not Met: []? Adjusted            6. Patient stated goal: Sleep through the night without waking due to pain. []? Progressing: []? Met: []? Not Met: []?  Adjusted    Prognosis: [x]Good   []Fair   []Poor    Patient Requires Follow-up:  [x]Yes  []No    Plan: []Plan of care initiated     [x]Continue per plan of care    [] Alter current plan (see comments)    []Hold pending MD visit []Discharge    Charges:  Timed Code Treatment Minutes: 43   Total Treatment Minutes:  43   [facility] time for each procedure?:  TE TIME:  NMR TIME:  MANUAL TIME:  UNTIMED MINUTES:       [] EVAL (LOW) 64901 (typically 20 minutes face-to-face)  [] EVAL (MOD) 83546 (typically 30 minutes face-to-face)  [] EVAL (HIGH) 24906 (typically 45 minutes face-to-face)  [] RE-EVAL     [x] HI(34776) x2     [] IONTO  [] NMR (01115) x     [] VASO  [x] Manual (08271) x 1    [] Other:  [] TA x      [] Mech Traction (70438)  [] ES(attended) (91013)     [] ES (un) (29640): Medicare Cap total YTD:   [x]N/A    Electronically signed by:  Nancy Ly, [id number]    Note: If patient does not return for scheduled/ recommended follow up visits, this note will serve as a discharge from care along with most recent update on progress.

## 2021-07-16 ENCOUNTER — APPOINTMENT (OUTPATIENT)
Dept: PHYSICAL THERAPY | Age: 56
End: 2021-07-16
Payer: COMMERCIAL

## 2021-07-20 ENCOUNTER — HOSPITAL ENCOUNTER (OUTPATIENT)
Dept: PHYSICAL THERAPY | Age: 56
Setting detail: THERAPIES SERIES
Discharge: HOME | End: 2021-07-20
Payer: COMMERCIAL

## 2021-07-20 NOTE — FLOWSHEET NOTE
Physical Therapy  Cancellation/No-show Note  Patient Name:  Chad Roman  :  1965   Date:  2021  Cancels to Date: 1  No-shows to Date: 2    For today's appointment patient:  [x]  Cancelled  []  Rescheduled appointment  []  No-show     Reason given by patient:  []  Patient ill  []  Conflicting appointment  []  No transportation    []  Conflict with work  []  No reason given  []  Other:     Comments:    Electronically signed [email or web address] [id number]

## 2021-07-23 ENCOUNTER — APPOINTMENT (OUTPATIENT)
Dept: PHYSICAL THERAPY | Age: 56
End: 2021-07-23
Payer: COMMERCIAL

## 2021-12-17 ENCOUNTER — OFFICE VISIT (OUTPATIENT)
Dept: ORTHOPEDIC SURGERY | Age: 56
End: 2021-12-17
Payer: COMMERCIAL

## 2021-12-17 VITALS — BODY MASS INDEX: 39.87 KG/M2 | WEIGHT: 225 LBS | HEIGHT: 63 IN

## 2021-12-17 DIAGNOSIS — M79.672 CHRONIC PAIN OF BOTH FEET: Primary | ICD-10-CM

## 2021-12-17 DIAGNOSIS — G89.29 CHRONIC PAIN OF BOTH FEET: Primary | ICD-10-CM

## 2021-12-17 DIAGNOSIS — G57.90 SURAL NEUROPATHY, UNSPECIFIED LATERALITY: ICD-10-CM

## 2021-12-17 DIAGNOSIS — M79.671 CHRONIC PAIN OF BOTH FEET: Primary | ICD-10-CM

## 2021-12-17 PROCEDURE — G8427 DOCREV CUR MEDS BY ELIG CLIN: HCPCS | Performed by: ORTHOPAEDIC SURGERY

## 2021-12-17 PROCEDURE — 3017F COLORECTAL CA SCREEN DOC REV: CPT | Performed by: ORTHOPAEDIC SURGERY

## 2021-12-17 PROCEDURE — 1036F TOBACCO NON-USER: CPT | Performed by: ORTHOPAEDIC SURGERY

## 2021-12-17 PROCEDURE — G8484 FLU IMMUNIZE NO ADMIN: HCPCS | Performed by: ORTHOPAEDIC SURGERY

## 2021-12-17 PROCEDURE — G8417 CALC BMI ABV UP PARAM F/U: HCPCS | Performed by: ORTHOPAEDIC SURGERY

## 2021-12-17 PROCEDURE — 99203 OFFICE O/P NEW LOW 30 MIN: CPT | Performed by: ORTHOPAEDIC SURGERY

## 2021-12-17 RX ORDER — GABAPENTIN 100 MG/1
CAPSULE ORAL
Qty: 150 CAPSULE | Refills: 0 | Status: SHIPPED | OUTPATIENT
Start: 2021-12-17 | End: 2022-01-17

## 2021-12-17 NOTE — PROGRESS NOTES
[facility]  Outpatient Progress Note  Jose Maria Garcia MD    Patient Name: Autumn Escobar MRN: <[id number]>   Age: 55 y.o. YOB: 1965   Sex: female      [address] [employer] Complaint   Patient presents with    New Patient     bi-lateral foot pain         HISTORY OF PRESENT ILLNESS   Autumn Escobar is a 55 y.o. female previous patient of Dr. Justyna Dean who has had several foot and ankle procedures in the past.  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 Justyna Dean had treated her with oral gabapentin. She then had a left ankle injury and underwent a left tib-fib syndesmosis repair and likewise developed some numbness in the lateral border of her foot. She presents the office today reporting that Dr. Justyna Dean had referred her to me because he has changed practices and no longer accepts her insurance. She reports today that her only intention for the visit was to get her gabapentin refilled. She also has a history of chronic low back pain and multiple medical comorbidities. Her primary care physician is Dr. Madhu La. She has not discussed her her prescription refill needs with the primary care physician thus far. She reports no other change in the condition of her feet or ankles. No recent injury. She reports no instability. She feels that the surgical repairs were successful. She is just bothered by the numbness in her feet.     Pain Assessment  Location of Pain: Foot  Location Modifiers: Left, Right  Severity of Pain: 0  Quality of Pain: Aching  Duration of Pain: Persistent  Aggravating Factors: Exercise, Walking, Standing  Relieving Factors: Exercise, Rest, Ice    PAST MEDICAL HISTORY      Past Medical History:   Diagnosis Date    Acid reflux     Arthritis     Asthma     Back pain with radiation     DOWN RIGHT LEG    Millard esophagus     Calculus of left ureter 2010    Depression     Diverticulosis     Elbow joint pain     Hyperlipemia     Hypertension     Kidney stone 20 years ago and     left hydronephrosis when pregnant    Symptomatic cholelithiasis        PAST SURGICAL HISTORY     Past Surgical History:   Procedure Laterality Date    ADENOIDECTOMY      ANKLE ARTHROSCOPY Right 6/10/2019    VIDEO ARTHROSCOPY RIGHT ANKLE performed by Paul Gallego MD at [address] Right     ANKLE SURGERY Left 2016    lateral ligament perineal tendon repair      SECTION      X1    CHOLECYSTECTOMY, LAPAROSCOPIC  2016    with intraoperative cholangiogram    COLONOSCOPY  06/15/2017    bx    CYSTOSCOPY  2010    flexible and rigid cystoscopy, ureteroscopy with left stone manipulation, laser lithotripsy, left ureteral stone retrieval, insertion of left ureteral stent     ELBOW SURGERY      RIGHT    ENDOSCOPY, COLON, DIAGNOSTIC  2016    ERCP    GASTRIC FUNDOPLICATION      HAND SURGERY Right 2016    thumb ligament    HYSTERECTOMY      INCONTINENCE SURGERY  2015    BLADDER TUCK    LAPAROSCOPY      LIGAMENT REPAIR Right 6/10/2019    LATERAL LIGAMENT REPAIR AND PERONEAL TENDON REPAIR performed by Paul Gallego MD at [address]  12    anterior posterior lumbar fusion L4-5,L5-S1    TONSILLECTOMY      UPPER GASTROINTESTINAL ENDOSCOPY  4/22/15    bx    UPPER GASTROINTESTINAL ENDOSCOPY  2016    bx esophagus-ulcer    UPPER GASTROINTESTINAL ENDOSCOPY  2016    bx esophagus       MEDICATIONS     Current Outpatient Medications   Medication Sig Dispense Refill    gabapentin (NEURONTIN) 100 MG capsule Take 2 capsules in the morning and 3 capsules at bedtime.  Intended supply: 30 days 150 capsule 0    gabapentin (NEURONTIN) 100 MG capsule TAKE 2 CAPSULES IN THE MORNING ~733Q4 TAKE 3 CAPSULES AT BEDTIME 150 capsule 10    pantoprazole (PROTONIX) 40 MG tablet Take 40 mg by mouth 2 times daily      desvenlafaxine succinate (PRISTIQ) 50 MG TB24 extended release tablet Take 50 mg by mouth daily      montelukast (SINGULAIR) 10 MG tablet Take 10 mg by mouth nightly      ALBUTEROL IN Inhale into the lungs as needed      albuterol (ACCUNEB) 0.63 MG/3ML nebulizer solution Take 1 ampule by nebulization as needed for Wheezing      Fluticasone-Umeclidin-Vilant (TRELEGY ELLIPTA IN) Inhale 1 puff into the lungs daily      gabapentin (NEURONTIN) 100 MG capsule TAKE 1 CAPSULE DAILY ~792Q5 TAKE 3 CAPSULES AT BEDTIME (Patient taking differently: TAKE 1 CAPSULE in am and  TAKE 3 CAPSULES AT BEDTIME) 120 capsule 11    benzonatate (TESSALON) 200 MG capsule Take 200 mg by mouth as needed       butalbital-acetaminophen-caffeine (FIORICET, ESGIC) -40 MG per tablet Take 1 tablet by mouth      diclofenac (VOLTAREN) 75 MG EC tablet Take 75 mg by mouth      aspirin 81 MG chewable tablet CHEW AND SWALLOW 1 TABLET BY MOUTH DAILY      buPROPion (WELLBUTRIN XL) 300 MG extended release tablet Take 300 mg by mouth every morning      ondansetron (ZOFRAN) 4 MG tablet Take 1 tablet by mouth every 8 hours as needed for Nausea 20 tablet 0    Multiple Vitamins-Minerals (THERAPEUTIC MULTIVITAMIN-MINERALS) tablet Take 1 tablet by mouth daily      Calcium Citrate-Vitamin D (CALCIUM + D PO) Take 600 mg by mouth daily       QUEtiapine (SEROQUEL) 25 MG tablet Take 50 mg by mouth nightly      tiZANidine (ZANAFLEX) 2 MG tablet Take 2 mg by mouth 2 times daily as needed 1 or 2 tabs      metoprolol (TOPROL-XL) 25 MG XL tablet Take 25 mg by mouth daily.  simvastatin (ZOCOR) 40 MG tablet Take 40 mg by mouth nightly. No current facility-administered medications for this visit.        ALLERGIES     Allergies   Allergen Reactions    Corticosteroids Itching    Codeine Itching    Sulfa Antibiotics Itching, Swelling and Rash       FAMILY HISTORY     Family History Problem Relation Age of Onset    High Blood Pressure Mother     Depression Mother     COPD Mother     Asthma Mother         copd    High Cholesterol Father     High Blood Pressure Father         atrial fibrillation       SOCIAL HISTORY     Social History     Socioeconomic History    Marital status:      Spouse name: Niko Wright Number of children: 3    Years of education: 15    Highest education level: None   Occupational History    None   Tobacco Use    Smoking status: Former Smoker     Packs/day: 0.20     Years: 20.00     Pack years: 4.00     Quit date: 2013     Years since quittin.6    Smokeless tobacco: Never Used   Substance and Sexual Activity    Alcohol use: Yes     Comment: 0-1 drink per month    Drug use: No    Sexual activity: Not Currently     Partners: Male   Other Topics Concern    None   Social History Narrative    None     Social Determinants of Health     Financial Resource Strain:     Difficulty of Paying Living Expenses: Not on file   Food Insecurity:     Worried About Running Out of Food in the Last Year: Not on file    Shanna of Food in the Last Year: Not on file   Transportation Needs:     Lack of Transportation (Medical): Not on file    Lack of Transportation (Non-Medical):  Not on file   Physical Activity:     Days of Exercise per Week: Not on file    Minutes of Exercise per Session: Not on file   Stress:     Feeling of Stress : Not on file   Social Connections:     Frequency of Communication with Friends and Family: Not on file    Frequency of Social Gatherings with Friends and Family: Not on file    Attends [employer] Services: Not on file    Active Member of Clubs or Organizations: Not on file    Attends Club or Organization Meetings: Not on file    Marital Status: Not on file   Intimate Partner Violence:     Fear of Current or Ex-Partner: Not on file    Emotionally Abused: Not on file    Physically Abused: Not on file    Sexually Abused: Not on file   Housing Stability:     Unable to Pay for Housing in the Last Year: Not on file    Number of Places Lived in the Last Year: Not on file    Unstable Housing in the Last Year: Not on file       REVIEW OF SYSTEMS   General: no fever, chills, night sweats, anorexia, malaise, fatigue, or weight change  Hematologic:  no unexplained bleeding or bruising  HEENT:   no nasal congestion, rhinorrhea, sore throat, or facial pain  Respiratory:  no cough, dyspnea, or chest pain  Cardiovascular:  no angina, RON, PND, orthopnea, dependent edema, or palpitations  Gastrointestinal:  no nausea, vomiting, diarrhea, constipation, or abdominal pain  Genitourinary:  no urinary urgency, frequency, dysuria, or hematuria  Musculoskeletal: see HPI  Endocrine:  no heat or cold intolerance and no polyphagia, polydipsia, or polyuria  Skin:  no skin eruptions or changing lesions  Neurologic:  no focal weakness, numbness/tingling, tremor, or severe headache. See HPI. See HPI for pertinent positives. PHYSICAL EXAM   Vital Signs:   Vitals:    12/17/21 1013   Weight: 225 lb (102.1 kg)   Height: 5' 3\" (1.6 m)       General appearance: healthy, alert, no distress  Skin: Skin color, texture, turgor normal. No rashes or lesions  HEENT: atraumatic, normocephalic. PERRL  Respiratory: Unlabored breathing  Lymphatic: No adenopathy   Neuro: Alert and oriented, normal distal sensation, normal bilateral DTRs  Vascular: Normal distal capillary and distal pulses  Muskuloskeletal Exam: Bilateral foot and ankle examination demonstrates no swelling erythema warmth ecchymosis or edema. Weightbearing alignment of the ankle hindfoot midfoot and forefoot are normal.  Gait appears normal.  She has subjective decreased light touch sensation in the lower lateral border of both feet also some decreased light touch sensation in the lateral aspect of the right calf but there is no motor deficit. She has normal deep tendon reflexes.   No skin lesions or callosities. She has normal capillary refill. Range of motion of the ankle is full. There is no crepitation or subluxation of the peroneal tendons. The ankles are stable to anterior drawer and varus stress testing. RADIOLOGY   X-rays obtained and reviewed in office:  Views bilateral feet standing 3 views    Impression: No acute bony abnormality    IMPRESSION     1. Chronic pain of both feet    2. Sural neuropathy, unspecified laterality         PLAN   I had a lengthy discussion with patient today regarding diagnosis and treatment options and recommendations. The patient does seem to have from chronic sensory loss in the lateral borders of the feet which would be consistent with sural neuropathy but she has not had EMG/NCV studies to confirm this as the primary diagnosis. Differential diagnosis includes lumbar radiculopathy or idiopathic peripheral neuropathy. In either event, she has no new musculoskeletal pathology. No new issues with the foot or ankle. Her surgery seem to have been successful. I will refill her gabapentin for 1 month but have recommended she transition her ongoing medication needs to her primary care physician. I offered her referral to pain management which she declines. FOLLOWUP     Return if symptoms worsen or fail to improve. Orders Placed This Encounter   Procedures    XR FOOT RIGHT (MIN 3 VIEWS)     Standing Status:   Future     Number of Occurrences:   1     Standing Expiration Date:   12/15/2022     Order Specific Question:   Reason for exam:     Answer:   PAIN    XR FOOT LEFT (MIN 3 VIEWS)     Standing Status:   Future     Number of Occurrences:   1     Standing Expiration Date:   12/15/2022     Order Specific Question:   Reason for exam:     Answer:   PAIN      Orders Placed This Encounter   Medications    gabapentin (NEURONTIN) 100 MG capsule     Sig: Take 2 capsules in the morning and 3 capsules at bedtime.  Intended supply: 30 days     Dispense:  150 capsule Refill:  0       Patient was instructed on appropriate use of braces, participation in home exercise programs, healthy lifestyle choices and weight loss as appropriate     Loretta Cannon MD

## 2023-06-09 ENCOUNTER — APPOINTMENT (OUTPATIENT)
Dept: GENERAL RADIOLOGY | Age: 58
End: 2023-06-09
Payer: COMMERCIAL

## 2023-06-09 ENCOUNTER — HOSPITAL ENCOUNTER (EMERGENCY)
Age: 58
Discharge: HOME | End: 2023-06-09
Payer: COMMERCIAL

## 2023-06-09 VITALS
DIASTOLIC BLOOD PRESSURE: 73 MMHG | BODY MASS INDEX: 40.74 KG/M2 | WEIGHT: 230 LBS | SYSTOLIC BLOOD PRESSURE: 111 MMHG | TEMPERATURE: 98 F | OXYGEN SATURATION: 99 % | RESPIRATION RATE: 16 BRPM | HEART RATE: 99 BPM

## 2023-06-09 DIAGNOSIS — L03.116 LEFT LEG CELLULITIS: Primary | ICD-10-CM

## 2023-06-09 LAB
ALBUMIN SERPL-MCNC: 4.6 G/DL (ref 3.4–5)
ALBUMIN/GLOB SERPL: 1.7 {RATIO} (ref 1.1–2.2)
ALP SERPL-CCNC: 109 U/L (ref 40–129)
ALT SERPL-CCNC: 28 U/L (ref 10–40)
ANION GAP SERPL CALCULATED.3IONS-SCNC: 13 MMOL/L (ref 3–16)
AST SERPL-CCNC: 22 U/L (ref 15–37)
BASOPHILS # BLD: 0 K/UL (ref 0–0.2)
BASOPHILS NFR BLD: 0.3 %
BILIRUB SERPL-MCNC: 0.7 MG/DL (ref 0–1)
BUN SERPL-MCNC: 16 MG/DL (ref 7–20)
CALCIUM SERPL-MCNC: 9.1 MG/DL (ref 8.3–10.6)
CHLORIDE SERPL-SCNC: 101 MMOL/L (ref 99–110)
CO2 SERPL-SCNC: 25 MMOL/L (ref 21–32)
CREAT SERPL-MCNC: 0.8 MG/DL (ref 0.6–1.1)
DEPRECATED RDW RBC AUTO: 13.3 % (ref 12.4–15.4)
EOSINOPHIL # BLD: 0 K/UL (ref 0–0.6)
EOSINOPHIL NFR BLD: 0.3 %
GFR SERPLBLD CREATININE-BSD FMLA CKD-EPI: >60 ML/MIN/{1.73_M2}
GLUCOSE SERPL-MCNC: 82 MG/DL (ref 70–99)
HCT VFR BLD AUTO: 37 % (ref 36–48)
HGB BLD-MCNC: 12.5 G/DL (ref 12–16)
LACTATE BLDV-SCNC: 1.5 MMOL/L (ref 0.4–1.9)
LYMPHOCYTES # BLD: 1.5 K/UL (ref 1–5.1)
LYMPHOCYTES NFR BLD: 16.9 %
MCH RBC QN AUTO: 30.8 PG (ref 26–34)
MCHC RBC AUTO-ENTMCNC: 33.7 G/DL (ref 31–36)
MCV RBC AUTO: 91.2 FL (ref 80–100)
MONOCYTES # BLD: 0.6 K/UL (ref 0–1.3)
MONOCYTES NFR BLD: 6.3 %
NEUTROPHILS # BLD: 6.7 K/UL (ref 1.7–7.7)
NEUTROPHILS NFR BLD: 76.2 %
PLATELET # BLD AUTO: 239 K/UL (ref 135–450)
PMV BLD AUTO: 7.9 FL (ref 5–10.5)
POTASSIUM SERPL-SCNC: 3.7 MMOL/L (ref 3.5–5.1)
PROT SERPL-MCNC: 7.3 G/DL (ref 6.4–8.2)
RBC # BLD AUTO: 4.06 M/UL (ref 4–5.2)
SODIUM SERPL-SCNC: 139 MMOL/L (ref 136–145)
WBC # BLD AUTO: 8.7 K/UL (ref 4–11)

## 2023-06-09 PROCEDURE — 6370000000 HC RX 637 (ALT 250 FOR IP): Performed by: NURSE PRACTITIONER

## 2023-06-09 PROCEDURE — 73552 X-RAY EXAM OF FEMUR 2/>: CPT

## 2023-06-09 PROCEDURE — 83605 ASSAY OF LACTIC ACID: CPT

## 2023-06-09 PROCEDURE — 80053 COMPREHEN METABOLIC PANEL: CPT

## 2023-06-09 PROCEDURE — 2580000003 HC RX 258: Performed by: NURSE PRACTITIONER

## 2023-06-09 PROCEDURE — 85025 COMPLETE CBC W/AUTO DIFF WBC: CPT

## 2023-06-09 RX ORDER — 0.9 % SODIUM CHLORIDE 0.9 %
1000 INTRAVENOUS SOLUTION INTRAVENOUS ONCE
Status: COMPLETED | OUTPATIENT
Start: 2023-06-09 | End: 2023-06-09

## 2023-06-09 RX ORDER — ACETAMINOPHEN 500 MG
1000 TABLET ORAL ONCE
Status: COMPLETED | OUTPATIENT
Start: 2023-06-09 | End: 2023-06-09

## 2023-06-09 RX ADMIN — SODIUM CHLORIDE 1000 ML: 9 INJECTION, SOLUTION INTRAVENOUS at 14:57

## 2023-06-09 RX ADMIN — ACETAMINOPHEN 1000 MG: 500 TABLET ORAL at 15:59

## 2023-06-09 ASSESSMENT — PAIN SCALES - GENERAL
PAINLEVEL_OUTOF10: 5
PAINLEVEL_OUTOF10: 6

## 2023-06-09 ASSESSMENT — PAIN - FUNCTIONAL ASSESSMENT: PAIN_FUNCTIONAL_ASSESSMENT: 0-10

## 2023-06-09 NOTE — ED PROVIDER NOTES
11/03/2016    with intraoperative cholangiogram    COLONOSCOPY  06/15/2017    bx    CYSTOSCOPY  11-    flexible and rigid cystoscopy, ureteroscopy with left stone manipulation, laser lithotripsy, left ureteral stone retrieval, insertion of left ureteral stent     ELBOW SURGERY      RIGHT    ENDOSCOPY, COLON, DIAGNOSTIC  11/08/2016    ERCP    GASTRIC FUNDOPLICATION      HAND SURGERY Right 05/2016    thumb ligament    HYSTERECTOMY (CERVIX STATUS UNKNOWN)      INCONTINENCE SURGERY  04/2015    BLADDER TUCK    LAPAROSCOPY      LIGAMENT REPAIR Right 6/10/2019    LATERAL LIGAMENT REPAIR AND PERONEAL TENDON REPAIR performed by Zofia Hale MD at [facility]  2/1/12    anterior posterior lumbar fusion L4-5,L5-S1    TONSILLECTOMY      UPPER GASTROINTESTINAL ENDOSCOPY  4/22/15    bx    UPPER GASTROINTESTINAL ENDOSCOPY  07/05/2016    bx esophagus-ulcer    UPPER GASTROINTESTINAL ENDOSCOPY  09/27/2016    bx esophagus       CURRENTMEDICATIONS       Discharge Medication List as of 6/9/2023  4:06 PM        CONTINUE these medications which have NOT CHANGED    Details   gabapentin (NEURONTIN) 100 MG capsule Take 2 capsules in the morning and 3 capsules at bedtime.  Intended supply: 30 days, Disp-150 capsule, R-0Normal      gabapentin (NEURONTIN) 100 MG capsule TAKE 2 CAPSULES IN THE MORNING ~829E9 TAKE 3 CAPSULES AT BEDTIME, Disp-150 capsule, R-10Normal      pantoprazole (PROTONIX) 40 MG tablet Take 40 mg by mouth 2 times dailyHistorical Med      desvenlafaxine succinate (PRISTIQ) 50 MG TB24 extended release tablet Take 50 mg by mouth dailyHistorical Med      montelukast (SINGULAIR) 10 MG tablet Take 10 mg by mouth nightlyHistorical Med      ALBUTEROL IN Inhale into the lungs as neededHistorical Med      albuterol (ACCUNEB) 0.63 MG/3ML nebulizer solution Take 1 ampule by nebulization as needed for WheezingHistorical Med      Fluticasone-Umeclidin-Vilant (TRELEGY ELLIPTA IN) Inhale 1 puff into the lungs

## 2023-06-09 NOTE — DISCHARGE INSTRUCTIONS
Continue the current antibiotics until completed. OK to take Ibuprofen 800 mg 3 times a day as needed for pain. Do not go above this amount or take any other NSAIDs (naprosyn, aspirin) while taking this medication. Please take with food and make sure to stay well hydrated. Apply warm compresses to the area 4 times a day to help the infection heal faster. OK to shower. Wash gently with normal saline or soap and water. Pat dry. Keep area clean and dry. Do not soak in tubs or pools. Watch for increasing redness outside of  markings if they were placed today. Signs of worsening infection include increased redness, swelling, redness streaking in the affected extremity, fever, increasing pain, pus-like drainage. Return to ED or be seen by PCP if you notice these symptoms. Return to PCP in 2 days for wound re-check. If you can not be seen by PCP, return to the ED. Return to the ED for new or worsening symptoms.

## 2024-01-12 ENCOUNTER — TRANSCRIBE ORDERS (OUTPATIENT)
Dept: ADMINISTRATIVE | Age: 59
End: 2024-01-12

## 2024-01-12 DIAGNOSIS — R10.9 ABDOMINAL PAIN, UNSPECIFIED ABDOMINAL LOCATION: Primary | ICD-10-CM

## 2025-05-30 ENCOUNTER — HOSPITAL ENCOUNTER (EMERGENCY)
Age: 60
Discharge: HOME | End: 2025-05-30
Attending: EMERGENCY MEDICINE
Payer: COMMERCIAL

## 2025-05-30 VITALS
WEIGHT: 245.2 LBS | DIASTOLIC BLOOD PRESSURE: 76 MMHG | RESPIRATION RATE: 14 BRPM | BODY MASS INDEX: 41.86 KG/M2 | TEMPERATURE: 97.7 F | OXYGEN SATURATION: 98 % | HEART RATE: 67 BPM | HEIGHT: 64 IN | SYSTOLIC BLOOD PRESSURE: 137 MMHG

## 2025-05-30 DIAGNOSIS — R11.2 NAUSEA AND VOMITING, UNSPECIFIED VOMITING TYPE: ICD-10-CM

## 2025-05-30 DIAGNOSIS — R19.7 DIARRHEA, UNSPECIFIED TYPE: Primary | ICD-10-CM

## 2025-05-30 LAB
ALBUMIN SERPL-MCNC: 4.9 G/DL (ref 3.4–5)
ALBUMIN/GLOB SERPL: 2.2 {RATIO} (ref 1.1–2.2)
ALP SERPL-CCNC: 182 U/L (ref 40–129)
ALT SERPL-CCNC: 78 U/L (ref 10–40)
ANION GAP SERPL CALCULATED.3IONS-SCNC: 13 MMOL/L (ref 3–16)
AST SERPL-CCNC: 36 U/L (ref 15–37)
BASOPHILS # BLD: 0.1 K/UL (ref 0–0.2)
BASOPHILS NFR BLD: 1.5 %
BILIRUB SERPL-MCNC: 0.6 MG/DL (ref 0–1)
BUN SERPL-MCNC: 11 MG/DL (ref 7–20)
CALCIUM SERPL-MCNC: 10 MG/DL (ref 8.3–10.6)
CHLORIDE SERPL-SCNC: 105 MMOL/L (ref 99–110)
CO2 SERPL-SCNC: 24 MMOL/L (ref 21–32)
CREAT SERPL-MCNC: 0.7 MG/DL (ref 0.6–1.1)
DEPRECATED RDW RBC AUTO: 16.2 % (ref 12.4–15.4)
EOSINOPHIL # BLD: 0.1 K/UL (ref 0–0.6)
EOSINOPHIL NFR BLD: 2 %
GFR SERPLBLD CREATININE-BSD FMLA CKD-EPI: >90 ML/MIN/{1.73_M2}
GLUCOSE SERPL-MCNC: 89 MG/DL (ref 70–99)
HCT VFR BLD AUTO: 40.7 % (ref 36–48)
HGB BLD-MCNC: 13.3 G/DL (ref 12–16)
LIPASE SERPL-CCNC: 65 U/L (ref 13–60)
LYMPHOCYTES # BLD: 2.2 K/UL (ref 1–5.1)
LYMPHOCYTES NFR BLD: 33.2 %
MCH RBC QN AUTO: 26.8 PG (ref 26–34)
MCHC RBC AUTO-ENTMCNC: 32.7 G/DL (ref 31–36)
MCV RBC AUTO: 82 FL (ref 80–100)
MONOCYTES # BLD: 0.4 K/UL (ref 0–1.3)
MONOCYTES NFR BLD: 5.8 %
NEUTROPHILS # BLD: 3.8 K/UL (ref 1.7–7.7)
NEUTROPHILS NFR BLD: 57.5 %
PLATELET # BLD AUTO: 331 K/UL (ref 135–450)
PMV BLD AUTO: 8 FL (ref 5–10.5)
POTASSIUM SERPL-SCNC: 3.8 MMOL/L (ref 3.5–5.1)
PROT SERPL-MCNC: 7.1 G/DL (ref 6.4–8.2)
RBC # BLD AUTO: 4.96 M/UL (ref 4–5.2)
SODIUM SERPL-SCNC: 142 MMOL/L (ref 136–145)
WBC # BLD AUTO: 6.6 K/UL (ref 4–11)

## 2025-05-30 PROCEDURE — 96361 HYDRATE IV INFUSION ADD-ON: CPT

## 2025-05-30 PROCEDURE — 2580000003 HC RX 258: Performed by: EMERGENCY MEDICINE

## 2025-05-30 PROCEDURE — 96375 TX/PRO/DX INJ NEW DRUG ADDON: CPT

## 2025-05-30 PROCEDURE — 6360000002 HC RX W HCPCS: Performed by: EMERGENCY MEDICINE

## 2025-05-30 PROCEDURE — 80053 COMPREHEN METABOLIC PANEL: CPT

## 2025-05-30 PROCEDURE — 96374 THER/PROPH/DIAG INJ IV PUSH: CPT

## 2025-05-30 PROCEDURE — 85025 COMPLETE CBC W/AUTO DIFF WBC: CPT

## 2025-05-30 PROCEDURE — 83690 ASSAY OF LIPASE: CPT

## 2025-05-30 PROCEDURE — 99284 EMERGENCY DEPT VISIT MOD MDM: CPT

## 2025-05-30 RX ORDER — ONDANSETRON 2 MG/ML
4 INJECTION INTRAMUSCULAR; INTRAVENOUS ONCE
Status: COMPLETED | OUTPATIENT
Start: 2025-05-30 | End: 2025-05-30

## 2025-05-30 RX ORDER — KETOROLAC TROMETHAMINE 30 MG/ML
15 INJECTION, SOLUTION INTRAMUSCULAR; INTRAVENOUS ONCE
Status: COMPLETED | OUTPATIENT
Start: 2025-05-30 | End: 2025-05-30

## 2025-05-30 RX ORDER — SODIUM CHLORIDE, SODIUM LACTATE, POTASSIUM CHLORIDE, AND CALCIUM CHLORIDE .6; .31; .03; .02 G/100ML; G/100ML; G/100ML; G/100ML
1000 INJECTION, SOLUTION INTRAVENOUS ONCE
Status: COMPLETED | OUTPATIENT
Start: 2025-05-30 | End: 2025-05-30

## 2025-05-30 RX ADMIN — ONDANSETRON 4 MG: 2 INJECTION, SOLUTION INTRAMUSCULAR; INTRAVENOUS at 16:40

## 2025-05-30 RX ADMIN — KETOROLAC TROMETHAMINE 15 MG: 30 INJECTION, SOLUTION INTRAMUSCULAR at 17:38

## 2025-05-30 RX ADMIN — SODIUM CHLORIDE, SODIUM LACTATE, POTASSIUM CHLORIDE, AND CALCIUM CHLORIDE 1000 ML: .6; .31; .03; .02 INJECTION, SOLUTION INTRAVENOUS at 16:42

## 2025-05-30 ASSESSMENT — PAIN SCALES - GENERAL
PAINLEVEL_OUTOF10: 0
PAINLEVEL_OUTOF10: 8

## 2025-05-30 ASSESSMENT — PAIN - FUNCTIONAL ASSESSMENT: PAIN_FUNCTIONAL_ASSESSMENT: 0-10

## 2025-05-30 NOTE — DISCHARGE INSTRUCTIONS
You were seen for diarrhea and nausea.  Your vital signs and exam were reassuring.  Laboratory test showed ALT 78, alkaline phos 182,    Rest. Drink plenty of fluids.   Hold your semaglutide until you can speak with your primary doctor about continuing.  Plan for repeat liver function tests to make sure they are not increasing.    Return to the ER with any worsening symptoms or new concerns

## 2025-05-30 NOTE — ED PROVIDER NOTES
Emergency Department Provider Note  Location: [facility] EMERGENCY DEPARTMENT  2025     Patient Identification  Shira Madrid is a 60 y.o. female    Chief Complaint  Other (Pt is on semaglutide, pt states she has been having diarrhea and vomiting since . /Pt states she has not had diarrhea today but was unable to eat rice last night. /Pt states she has been on the medication for the past 6 weeks, pt states she gets her meds from an online pharmacy. )          HPI  (History provided by patient and spouse/SO)  Patient presents with 5 days of diarrhea.  She has a history of obesity.  She started taking semaglutide 6 weeks ago, and 1 week ago she increased her dose of this medication.  She believes her medication may be related to her symptoms-over the weekend she had nausea vomiting.  Since then she has had low p.o. intake and persistent watery diarrhea.  When she eats she has to use the bathroom quickly afterwards.  No fevers, chills, cough, chest pain, shortness of breath, abdominal pain, dysuria/hematuria.  No sick contacts.    Nursing Notes reviewed.    Allergies:   Allergies   Allergen Reactions    Corticosteroids Itching    Codeine Itching    Sulfa Antibiotics Itching, Swelling and Rash       Past medical history:  has a past medical history of Acid reflux, Arthritis, Asthma, Back pain with radiation, Millard esophagus, Calculus of left ureter (2010), Depression, Diverticulosis, Elbow joint pain, Hyperlipemia, Hypertension, Kidney stone (20 years ago and ), and Symptomatic cholelithiasis.    Past surgical history:  has a past surgical history that includes Hysterectomy; Ankle surgery (Right); Tonsillectomy; Adenoidectomy; laparoscopy;  section (); Cystoscopy (2010); Elbow surgery; lumbar fusion (12); Incontinence surgery (2015); Upper gastrointestinal endoscopy (4/22/15); Hand surgery (Right, 2016); Upper gastrointestinal endoscopy (2016); Upper

## (undated) DEVICE — DRAPE EQUIP C ARM MINI 10000100] TIDI PRODUCTS INC]

## (undated) DEVICE — GLOVE SURG SZ 8 L12IN FNGR THK94MIL STD WHT LTX FREE

## (undated) DEVICE — SET ADMIN PRIMING 7ML L30IN 7.35LB 20 GTT 2ND RLER CLMP

## (undated) DEVICE — PADDING UNDERCAST W4INXL4YD COT FBR LO LINTING WYTEX

## (undated) DEVICE — PACK COOL L W14XL6.5IN 3 LAYR CONSTR SFT CLP CLSR 4 TIE

## (undated) DEVICE — KENDALL SCD EXPRESS SLEEVES, KNEE LENGTH, MEDIUM: Brand: KENDALL SCD

## (undated) DEVICE — INTENDED FOR TISSUE SEPARATION, AND OTHER PROCEDURES THAT REQUIRE A SHARP SURGICAL BLADE TO PUNCTURE OR CUT.: Brand: BARD-PARKER ® STAINLESS STEEL BLADES

## (undated) DEVICE — PADDING CAST W4INXL4YD NONSTERILE COT RAYON MICROPLEATED

## (undated) DEVICE — SOLUTION IV IRRIG 500ML 0.9% SODIUM CHL 2F7123

## (undated) DEVICE — TELFA NON-ADHERENT ABSORBENT DRESSING: Brand: TELFA

## (undated) DEVICE — PADDING UNDERCAST W6INXL4YD WYTEX 6 PER BG

## (undated) DEVICE — MEDI-VAC NON-CONDUCTIVE SUCTION TUBING: Brand: CARDINAL HEALTH

## (undated) DEVICE — CATHETER IV 20GA L1.25IN PNK FEP SFTY STR HUB RADPQ DISP

## (undated) DEVICE — CUFF 30 DPSB DISP CNTOUR

## (undated) DEVICE — 35 ML SYRINGE LUER-LOCK TIP: Brand: MONOJECT

## (undated) DEVICE — SUTURE VCRL SZ 2-0 L18IN ABSRB UD CT-1 L36MM 1/2 CIR J839D

## (undated) DEVICE — PACK EXTREMITY XR

## (undated) DEVICE — NEEDLE SPNL L3.5IN PNK HUB S STL REG WALL FIT STYL W/ QNCKE

## (undated) DEVICE — DYONICS 2.9 MM FULL RADIUS BLADES,                                    7 CM LENGTH, RED, PACKAGED 6 PER                                    BOX, STERILE: Brand: DYONICS POWERMINI

## (undated) DEVICE — WAX SURG 2.5GM HEMSTAT BNE BEESWAX PARAFFIN ISO PALMITATE

## (undated) DEVICE — TOWEL,OR,DSP,ST,BLUE,STD,4/PK,20PK/CS: Brand: MEDLINE

## (undated) DEVICE — ZIMMER® STERILE DISPOSABLE TOURNIQUET CUFF WITH PLC, DUAL PORT, SINGLE BLADDER, 30 IN. (76 CM)

## (undated) DEVICE — ZIP 8I SURGICAL SKIN CLOSURE DEVICE: Brand: ZIP 8I SURGICAL SKIN CLOSURE DEVICE

## (undated) DEVICE — SUTURE VCRL SZ 3-0 L18IN ABSRB UD L26MM SH 1/2 CIR J864D

## (undated) DEVICE — SPLINT ORTH W4XL30IN LAYERED FBRGLS FOAM PD BRTH BK MOLD

## (undated) DEVICE — SUTURE MERS SZ 4-0 L18IN NONABSORBABLE WHT L13MM P-3 3/8 R691G

## (undated) DEVICE — SUTURE ETHBND EXCEL SZ 2-0 L18IN NONABSORBABLE GRN L22MM CX42D

## (undated) DEVICE — SET GRAV VENT NVENT CK VLV 3 NDL FREE PRT 10 GTT

## (undated) DEVICE — SOLUTION IV 1000ML LAC RINGERS PH 6.5 INJ USP VIAFLX PLAS

## (undated) DEVICE — 3M™ TEGADERM™ TRANSPARENT FILM DRESSING FRAME STYLE, 1624W, 2-3/8 IN X 2-3/4 IN (6 CM X 7 CM), 100/CT 4CT/CASE: Brand: 3M™ TEGADERM™

## (undated) DEVICE — ELECTRODE,ECG,STRESS,FOAM,3PK: Brand: MEDLINE

## (undated) DEVICE — GUHL ANKLE DISTRACTOR FOOT STRAPS,                                    STERILE, LATEX FREE BOX OF 6

## (undated) DEVICE — SUTURE MCRYL SZ 4-0 L18IN ABSRB UD L19MM PS-2 3/8 CIR PRIM Y496G